# Patient Record
Sex: MALE | Race: WHITE | NOT HISPANIC OR LATINO | Employment: FULL TIME | URBAN - METROPOLITAN AREA
[De-identification: names, ages, dates, MRNs, and addresses within clinical notes are randomized per-mention and may not be internally consistent; named-entity substitution may affect disease eponyms.]

---

## 2017-03-01 DIAGNOSIS — D67 CONGENITAL FACTOR IX DISORDER (H): ICD-10-CM

## 2018-01-23 ENCOUNTER — TELEPHONE (OUTPATIENT)
Dept: HEMATOLOGY | Facility: CLINIC | Age: 38
End: 2018-01-23

## 2018-06-13 ENCOUNTER — TELEPHONE (OUTPATIENT)
Dept: ONCOLOGY | Facility: CLINIC | Age: 38
End: 2018-06-13

## 2018-06-14 ENCOUNTER — OFFICE VISIT (OUTPATIENT)
Dept: HEMATOLOGY | Facility: CLINIC | Age: 38
End: 2018-06-14
Attending: PHYSICIAN ASSISTANT
Payer: COMMERCIAL

## 2018-06-14 ENCOUNTER — TELEPHONE (OUTPATIENT)
Dept: HEMATOLOGY | Facility: CLINIC | Age: 38
End: 2018-06-14

## 2018-06-14 VITALS
RESPIRATION RATE: 12 BRPM | WEIGHT: 183.6 LBS | OXYGEN SATURATION: 99 % | BODY MASS INDEX: 24.33 KG/M2 | TEMPERATURE: 97.5 F | DIASTOLIC BLOOD PRESSURE: 74 MMHG | SYSTOLIC BLOOD PRESSURE: 118 MMHG | HEART RATE: 67 BPM | HEIGHT: 73 IN

## 2018-06-14 DIAGNOSIS — D67 MILD HEMOPHILIA B (H): Primary | ICD-10-CM

## 2018-06-14 DIAGNOSIS — M62.89 HEMORRHAGE OF MUSCLE: ICD-10-CM

## 2018-06-14 PROCEDURE — 25000555 ZZHC RX FACTOR IP 250 OP 636

## 2018-06-14 PROCEDURE — 99213 OFFICE O/P EST LOW 20 MIN: CPT | Performed by: PHYSICIAN ASSISTANT

## 2018-06-14 PROCEDURE — G0463 HOSPITAL OUTPT CLINIC VISIT: HCPCS

## 2018-06-14 RX ORDER — COAGULATION FACTOR IX (RECOMBINANT) 3000 UNIT
3000 KIT INTRAVENOUS ONCE
Qty: 3000 UNITS | Refills: 0 | Status: SHIPPED | OUTPATIENT
Start: 2018-06-14 | End: 2018-06-14

## 2018-06-14 RX ORDER — COAGULATION FACTOR IX (RECOMBINANT) 3000 UNIT
4100 KIT INTRAVENOUS ONCE
Status: CANCELLED
Start: 2018-06-14 | End: 2018-06-14

## 2018-06-14 RX ORDER — COAGULATION FACTOR IX (RECOMBINANT) 3000 UNIT
4020 KIT INTRAVENOUS ONCE
Status: COMPLETED | OUTPATIENT
Start: 2018-06-14 | End: 2018-06-14

## 2018-06-14 RX ADMIN — COAGULATION FACTOR IX (RECOMBINANT) 4020 UNITS: KIT at 15:55

## 2018-06-14 ASSESSMENT — PAIN SCALES - GENERAL: PAINLEVEL: MILD PAIN (2)

## 2018-06-14 NOTE — PROGRESS NOTES
"    [facility]  [address] [place] [address], [place] [address]  Main: [phone], Fax: [phone]    Patient seen at: [facility] at [address] [place] [address].    Outpatient Visit Note:    Patient: Armand Martin  MRN: [id number]  : 1980  KAVIN: 2018    Reason:  Mild Hemophilia B. Left hamstring pain.     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 Armand was last seen by Yinka Cano CNP back in 2015 for his routine comprehensive clinic visit.     About 3 weeks ago, he was playing softball and during a pitching maneuver, he felt a slight pull of the left hamstring. He took a few weeks resting this area and his symptoms eventually resolved without any medical attention.     Then, Armand apparently was playing softball yesterday when he was splinting to a base and felt a \"pop\" at his left hamstring muscle. He immediately felt pain and he left the game immediately and went home. He reports that once he arrived home, he immediate rest, ice and placed a compression bandage over the left hamstring area. He then called the on-call hematology fellow and the fellow also discussed the case with Dr. Langford, who told the patient to take a dose of  Factor IX product that he has at home. He reports that it was Benefix at around 3000 Units and it was  for about 3 years ago. The hematology fellow also recommended that he should be evaluated at the Emergency department. However, the patient refused to be seen at the Emergency department.    Armand reports that he spent about 12 hours resting, icing, elevating and using compression of his left hamstring. 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 He called our center reporting the incident and I am able to see him in clinic today.    He reports that his left hamstring pain has improved slightly as compared to yesterday. He denies left hip or left knee pain. He does complaint of pain with ambulating / bearing weight. No pain with flexion of the knee.     Medications, Allergies, PmHx:   Reviewed by me in the electronic chart.     Social History and Family History:  Deferred.    Objective:  In no acute distress.  Vitals: /74 (BP Location: Left arm, Patient Position: Sitting, Cuff Size: Adult Regular)  Pulse 67  Temp 97.5  F (36.4  C) (Oral)  Resp 12  Ht 1.847 m (6' 0.7\")  Wt 83.3 kg (183 lb 9.6 oz)  SpO2 99%  BMI 24.42 kg/m2  Exam:  His left hamstring is slightly more larger as compare to his right. There is more pain on palpation just below his gluteal muscle, slight discoloration (ecchymosis) at this area. There is some swelling and mild induration at the distal part of the hamstring. No pain with flexion of the knee. No other ecchymosis noted.   Full active ROM of left hip and left knee. Left knee without any swelling or effusion.    Imaging:  Bedside ultrasound performed showed a disruption of muscle fiber at the bicep femoris at site of where the patient has the greatest tenderness. There is an area of small fluid collection at the medial side of the hamstring just proximal to the left knee.    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 The mechanism of injury, clinical exam and bedside ultrasound are all consistent with likely partial hamstring tear. There is no definitive large intra-muscular hemorrhage noted, however, he likely has mild soft tissue hemorrhage.     Diagnosis:  1. Mild Hemophilia B.   2. Mild soft tissue hemorrhage of the " left hamstring.   3. Left hamstring mild partial muscle tear.    Plan:  Reassure the patient that we do not find a large intra-muscular hemorrhage. However, given the mechanism of injury, I will give him one dose of Benefix at 4000 Units (50 Units/kg) today here at the clinic.     He is instructed to continue to rest, ice, compress and elevated.     I will plan on seeing him back in 2-3 weeks for a follow up clinic visit.    He will also need to schedule for a return overdue hemophilia comprehensive clinic visit.       Wayne Hayes PA-C, MPAS  Physician Assistant  [facility].

## 2018-06-14 NOTE — TELEPHONE ENCOUNTER
Patient with known hemopholia B, fr IX level of 17 % calling for advise. He pulled his hamstring muscle while playing soft ball today, he felt a pop and developed a pain. He has  factor IX at home ( 3 years ago) wondering if he can use it. I advised him to come to the ED for evaluation with CT scan and for fr IX. He is not willing to come to ED at the moment. Discussed It might not be harmful to give himself a dose of factor at home but it might lose some of it activity. He is willing to give himself a dose and then be seen in coag clinic in the morning. He knows to go to the ED if he has worsening symptoms, pain, edema, dizziness.He verbalized understanding. Case discussed with Dr. Langford.

## 2018-06-14 NOTE — TELEPHONE ENCOUNTER
"Armand Martin  MRN: [id number]  Male, 37 year old, 1980  Hemophilia B 17%    Faraz called today reporting that he felt a \"pop\" of his mid left hamstring last night playing softball. He had an  dose (exp 2017) and infused himself with it last night.   He has no additional factor at home. His hamstring feels \"tight\".  His pain level at rest is between 3-4, and slightly more when walking.  He has not looked at his hamstring to see if swollen or bruised (because it has been wrapped). He is not using crutches and does not have them at home.  He rested, iced, and used compression last evening.  He did go to work, but left early about 12:30 to go home & rest/ice more. He did call into on call resident last evening, but did not want to go to the ER or get a CT.    We have encouraged him to come into clinic this afternoon to be assessed with possible ultrasound to see if bleeding.  If so, we can infuse with factor.  He agreed to this plan.  Mila Ellis, RN, MSN -Nurse Clinician, [facility] [phone]   "

## 2018-06-14 NOTE — NURSING NOTE
Armand Martin  MRN: [id number]  Male, 37 year old, 1980    Reviewed allergy & med list & updated as needed.    Saw Faraz with MIGDALIA Whitley and Adriana Benson, PT.  Adriana did US imaging on his left hamstring.  There was a small amount of fluid lower than his site of pain.  It is uncertain if this is blood.  MIGDALIA Whitley decided to treat with Benefix 4000 units x1.  Additional recommendations in regards to rest and initiation of stretching were given by Adriana.      Infused Benefix 4020 units into left antecubital vein without problem (Exp 6/2019 Lot [id number]).      We have asked him to return in 2-3 weeks.  Can be combined with CC or comp clinic to follow later.  Mila Ellis, RN, MSN -Nurse Clinician, [facility] [phone]

## 2018-06-14 NOTE — MR AVS SNAPSHOT
"              After Visit Summary   6/14/2018    Armand Martin    MRN: [id number]           Patient Information     Date Of Birth          1980        Visit Information        Provider Department      6/14/2018 2:15 PM Wayne Hayes PA-C [facility]        Today's Diagnoses     Mild hemophilia B (H)    -  1    Hemorrhage of muscle           Follow-ups after your visit        Follow-up notes from your care team     Return in about 2 weeks (around 6/28/2018).      Your next 10 appointments already scheduled     Jul 09, 2018  1:00 PM CDT   COMPREHENSIVE CLINIC VISIT with Robert Langford MD   [facility] ([facility])    [address] [place] [id number] [phone]              Who to contact     If you have questions or need follow up information about today's clinic visit or your schedule please contact [facility] directly at [phone].  Normal or non-critical lab and imaging results will be communicated to you by [facility]cattershart, letter or phone within 4 business days after the clinic has received the results. If you do not hear from us within 7 days, please contact the clinic through [facility] or phone. If you have a critical or abnormal lab result, we will notify you by phone as soon as possible.  Submit refill requests through [employer]Cell or call your pharmacy and they will forward the refill request to us. Please allow 3 business days for your refill to be completed.          Additional Information About Your Visit        Health Wildcatter[facility]art Information     SynapCell lets you send messages to your doctor, view your test results, renew your prescriptions, schedule appointments and more. To sign up, go to [email or web address] . Click on \"Log in\" on the left side of the screen, which will take you to the Welcome page. Then click on \"Sign up Now\" on the right side of " "the page.     You will be asked to enter the access code listed below, as well as some personal information. Please follow the directions to create your username and password.     Your access code is: [email or web address]  Expires: 2018  4:49 PM     Your access code will  in 90 days. If you need help or a new code, please call your [place] clinic or [phone].        Care EveryWhere ID     This is your Care EveryWhere ID. This could be used by other organizations to access your [place] medical records  [id number]        Your Vitals Were     Pulse Temperature Respirations Height Pulse Oximetry BMI (Body Mass Index)    67 97.5  F (36.4  C) (Oral) 12 1.847 m (6' 0.7\") 99% 24.42 kg/m2       Blood Pressure from Last 3 Encounters:   18 118/74   09/11/15 127/75   12 114/72    Weight from Last 3 Encounters:   18 83.3 kg (183 lb 9.6 oz)   09/11/15 83.4 kg (183 lb 14.4 oz)   12 79.1 kg (174 lb 4.8 oz)              Today, you had the following     No orders found for display         Today's Medication Changes          These changes are accurate as of 18  4:49 PM.  If you have any questions, ask your nurse or doctor.               Start taking these medicines.        Dose/Directions    BENEFIX 3000 units Kit   Used for:  Mild hemophilia B (H), Hemorrhage of muscle   Started by:  Wayne Hayes, APRIL        Dose:  3000 Units   Inject 3,000 Units into the vein once for 1 dose Infuse Benefix at 3000 Units +/- 10% IV as needed for acute bleeding episodes.   Quantity:  3000 Units   Refills:  0            Where to get your medicines      These medications were [facility] to [facility] [address] [place] [address] [address] [place] [address]     Phone:  [phone]     BENEFIX 3000 units Kit                Primary Care Provider Office Phone # Fax #    Yvon HUBERT Ricardo [phone]       [facility] [address]  [facility] [address]        Equal Access to Services     SARIKA GÓMEZ : Hadii aad ku hadleilairis Larkin, waselinaanjali birmingham, tarynhina saullaneyanjali gordon, cassie joseanna marieantoine bah . So [place] -[phone].    ATENCIÓN: Si habla español, tiene a novoa disposición servicios gratuitos de asistencia lingüística. Llame al [phone].    We comply with applicable federal civil rights laws and [place] laws. We do not discriminate on the basis of race, color, national origin, age, disability, sex, sexual orientation, or gender identity.            Thank you!     Thank you for choosing [facility]  for your care. Our goal is always to provide you with excellent care. Hearing back from our patients is one way we can continue to improve our services. Please take a few minutes to complete the written survey that you may receive in the mail after your visit with us. Thank you!             Your Updated Medication List - Protect others around you: Learn how to safely use, store and throw away your medicines at www.disposemymeds.org.          This list is accurate as of 6/14/18  4:49 PM.  Always use your most recent med list.                   Brand Name Dispense Instructions for use Diagnosis    BENEFIX 3000 units Kit     3000 Units    Inject 3,000 Units into the vein once for 1 dose Infuse Benefix at 3000 Units +/- 10% IV as needed for acute bleeding episodes.    Mild hemophilia B (H), Hemorrhage of muscle       probiotic Caps      Take 1 capsule by mouth daily.        vitamin D 2000 units Caps      Take 2,000 Units by mouth daily.

## 2018-07-09 ENCOUNTER — OFFICE VISIT (OUTPATIENT)
Dept: HEMATOLOGY | Facility: CLINIC | Age: 38
End: 2018-07-09

## 2018-07-09 ENCOUNTER — OFFICE VISIT (OUTPATIENT)
Dept: HEMATOLOGY | Facility: CLINIC | Age: 38
End: 2018-07-09
Attending: INTERNAL MEDICINE
Payer: COMMERCIAL

## 2018-07-09 ENCOUNTER — OFFICE VISIT (OUTPATIENT)
Dept: HEMATOLOGY | Facility: CLINIC | Age: 38
End: 2018-07-09
Attending: GENETIC COUNSELOR, MS
Payer: COMMERCIAL

## 2018-07-09 ENCOUNTER — HOSPITAL ENCOUNTER (OUTPATIENT)
Dept: PHYSICAL THERAPY | Facility: CLINIC | Age: 38
Setting detail: THERAPIES SERIES
End: 2018-07-09
Attending: PHYSICAL THERAPIST
Payer: COMMERCIAL

## 2018-07-09 VITALS
DIASTOLIC BLOOD PRESSURE: 73 MMHG | SYSTOLIC BLOOD PRESSURE: 124 MMHG | OXYGEN SATURATION: 99 % | HEIGHT: 73 IN | TEMPERATURE: 97.6 F | HEART RATE: 64 BPM | WEIGHT: 182.4 LBS | BODY MASS INDEX: 24.18 KG/M2 | RESPIRATION RATE: 12 BRPM

## 2018-07-09 DIAGNOSIS — D67 HEMOPHILIA B (H): Primary | ICD-10-CM

## 2018-07-09 DIAGNOSIS — S76.312S HAMSTRING MUSCLE STRAIN, LEFT, SEQUELA: ICD-10-CM

## 2018-07-09 DIAGNOSIS — D67 MILD HEMOPHILIA B (H): Primary | ICD-10-CM

## 2018-07-09 DIAGNOSIS — Z71.9 ENCOUNTER FOR COUNSELING: Primary | ICD-10-CM

## 2018-07-09 PROCEDURE — 97110 THERAPEUTIC EXERCISES: CPT | Mod: GP | Performed by: PHYSICAL THERAPIST

## 2018-07-09 PROCEDURE — 99214 OFFICE O/P EST MOD 30 MIN: CPT | Performed by: INTERNAL MEDICINE

## 2018-07-09 PROCEDURE — 40000176 ZZH STATISTIC PT HEMOPHILIA CLINIC VISIT: Performed by: PHYSICAL THERAPIST

## 2018-07-09 PROCEDURE — 97161 PT EVAL LOW COMPLEX 20 MIN: CPT | Mod: GP | Performed by: PHYSICAL THERAPIST

## 2018-07-09 ASSESSMENT — PAIN SCALES - GENERAL: PAINLEVEL: NO PAIN (0)

## 2018-07-09 NOTE — MR AVS SNAPSHOT
"              After Visit Summary   2018    Armand Martin    MRN: [id number]           Patient Information     Date Of Birth          1980        Visit Information        Provider Department      2018 1:04 PM [facility]        Today's Diagnoses     Encounter for counseling    -  1       Follow-ups after your visit        Who to contact     If you have questions or need follow up information about today's clinic visit or your schedule please contact [facility] directly at [phone].  Normal or non-critical lab and imaging results will be communicated to you by ShareSDKhart, letter or phone within 4 business days after the clinic has received the results. If you do not hear from us within 7 days, please contact the clinic through ShareSDKhart or phone. If you have a critical or abnormal lab result, we will notify you by phone as soon as possible.  Submit refill requests through [employer] CRM or call your pharmacy and they will forward the refill request to us. Please allow 3 business days for your refill to be completed.          Additional Information About Your Visit        MyChart Information     Full [employer] CRM lets you send messages to your doctor, view your test results, renew your prescriptions, schedule appointments and more. To sign up, go to [email or web address] . Click on \"Log in\" on the left side of the screen, which will take you to the Welcome page. Then click on \"Sign up Now\" on the right side of the page.     You will be asked to enter the access code listed below, as well as some personal information. Please follow the directions to create your username and password.     Your access code is: [email or web address]  Expires: 2018  4:49 PM     Your access code will  in 90 days. If you need help or a new code, please call your [place] clinic or [phone].        Care EveryWhere ID     This is your Care EveryWhere ID. " This could be used by other organizations to access your [place] medical records  [id number]         Blood Pressure from Last 3 Encounters:   07/09/18 124/73   06/14/18 118/74   09/11/15 127/75    Weight from Last 3 Encounters:   07/09/18 82.7 kg (182 lb 6.4 oz)   06/14/18 83.3 kg (183 lb 9.6 oz)   09/11/15 83.4 kg (183 lb 14.4 oz)              Today, you had the following     No orders found for display       Primary Care Provider Office Phone # Fax #    Yvon Ricardo [phone]       [facility] [address]  [facility] [address]        Equal Access to Services     SARIKA GÓMEZ : Hadii walker irelando Soomaali, waaxda luqadaha, qaybta kaalmada adeegyada, cassie bah . So [facility] [phone].    ATENCIÓN: Si habla español, tiene a novoa disposición servicios gratuitos de asistencia lingüística. [facility] [phone].    We comply with applicable federal civil rights laws and [place] laws. We do not discriminate on the basis of race, color, national origin, age, disability, sex, sexual orientation, or gender identity.            Thank you!     Thank you for choosing [facility]  for your care. Our goal is always to provide you with excellent care. Hearing back from our patients is one way we can continue to improve our services. Please take a few minutes to complete the written survey that you may receive in the mail after your visit with us. Thank you!             Your Updated Medication List - Protect others around you: Learn how to safely use, store and throw away your medicines at www.disposemymeds.org.          This list is accurate as of 7/9/18 11:59 PM.  Always use your most recent med list.                   Brand Name Dispense Instructions for use Diagnosis    GLUCOSAMINE SULFATE PO      Take 1,000 mg by mouth daily Takes occassionally        probiotic Caps      Take 1 capsule by mouth daily.         vitamin D 2000 units Caps      Take 2,000 Units by mouth daily.

## 2018-07-09 NOTE — MR AVS SNAPSHOT
"                  MRN:[id number]                      After Visit Summary   7/9/2018    Armand Martin    MRN: [id number]           Visit Information        Provider Department      7/9/2018 1:00 PM Robert Langford MD [facility]        Care Instructions    If on demand dosing, please call with any bleeding.    For emergency head trauma, please treat with at least 80 units/kg to boost your factor level to 100%.  Please seek emergency care for head CT scan.    Carry factor concentrate with you at all times. Call the center if you will be traveling out of the area. We can create a travel letter prior to your departure . For treatment centers around the world go to [email or web address], click on top right link \"RESOURCES\" and then scroll down to  \"Find a Treatment [place]\". Enter country, then scroll down to city closest to destination.    Wear medic alert on your person for highest level of safety www.Class[employer].org    See dentist every 6 months. Call the center for recommendations for dental visits that involve more than an examination and cleaning. If you have had a joint replacement or port for infusions, please call for antibiotic recommendations for dental cleaning.    See primary care provider for general health maintenance.    For any questions, please contact your [facility] nurse, Mila Ellis RN at [phone] or the main phone number [phone].    If you have emergency needs in the evening or weekend, please call [phone] and ask for the hematologist on call or Dr. Robert Langford.    Please return for comprehensive clinic in 1 year.           Further instructions from your care team       PHYSICAL THERAPY INSTRUCTIONS:    Initiate exercises as issued today, completing every other day using the day in between to assess response.  Increase or decrease reps and resistance based on response.     Continue to monitor left hamstring for full return of strength and function.  Contact Adriana" with any ongoing concerns.  Treatment of New Joint and/or Muscle Bleeds:     Treat with factor per doctor s orders.    Apply RICE treatment as needed for pain relief and to allow rest and healing   o R=Rest, Limit movement and protect your joint with splints, braces and assistive devices as directed by your treatment center.  Use crutches and do not put any weight on a new lower extremity bleed (ex: hip, knee, ankle).   Only move in pain-free range.  o I=Ice, Apply ice to surround the affected area for 15-20 minutes every 2 hours.  Use ice cubes in bag, frozen vegetables, gel ice packs, or commercial products (Cryocuff ).  o C=Compression, Helps to control swelling and can decrease pain. Can use elastic wraps or compression sleeve such as tubigrip.  Avoid tourniquet effect by using proper technique. Remove if pain increases or with any numbness or tingling.   o E=Elevate, Can help decrease swelling and encourages rest.  Most effective with elevation above level of the heart.  Joint Health: Bleed prevention and regular activity are key elements to maintaining health joints. Limit activities that cause joint pain and spread out activities/exercises throughout the day and the week, alternate exercises each day.    Footwear: Always wear supportive footwear-shoes with laces and good ankle support are best.  If you experience frequent foot/ankle pain, try wearing shoes indoors as well as outdoors.    Cardiovascular activity recommendations (18 years and over): For health benefits, adults need at least 150 minutes (about 30 minutes, 5 days a week) of moderate intensity aerobic activity each week AND 2 or more days of muscle strengthening to include all major muscle groups.  Also include a general stretching program into your daily routine.  Try breaking up your aerobic activity into small chunks of time during the day,  10 minutes at a time.      Apoorva Benson [facility]  Physical Therapist  [facility]orders  [phone]      MyChart Information     Triumfant lets you send messages to your doctor, view your test results, renew your prescriptions, schedule appointments and more. To sign up, go to [email or web address] . Click on \"Log in\" on the left side of the screen, which will take you to the Welcome page. Then click on \"Sign up Now\" on the right side of the page.     You will be asked to enter the access code listed below, as well as some personal information. Please follow the directions to create your username and password.     Your access code is: [email or web address]  Expires: 2018  4:49 PM     Your access code will  in 90 days. If you need help or a new code, please call your [place] clinic or [phone].        Care EveryWhere ID     This is your Care EveryWhere ID. This could be used by other organizations to access your [place] medical records  [id number]        Equal Access to Services     SARIKA GÓMEZ : Hadii walker romero hadasho Sotahiraali, waaxda luqadaha, qaybta kaalmada adeegyaanjali, cassie bah . So [facility] [phone].    ATENCIÓN: Si habla español, tiene a novoa disposición servicios gratuitos de asistencia lingüística. Llame al [phone].    We comply with applicable federal civil rights laws and [place] laws. We do not discriminate on the basis of race, color, national origin, age, disability, sex, sexual orientation, or gender identity.            "

## 2018-07-09 NOTE — DISCHARGE INSTRUCTIONS
PHYSICAL THERAPY INSTRUCTIONS:    Initiate exercises as issued today, completing every other day using the day in between to assess response.  Increase or decrease reps and resistance based on response.     Continue to monitor left hamstring for full return of strength and function.  Contact Adriana with any ongoing concerns.  Treatment of New Joint and/or Muscle Bleeds:     Treat with factor per doctor s orders.    Apply RICE treatment as needed for pain relief and to allow rest and healing   o R=Rest, Limit movement and protect your joint with splints, braces and assistive devices as directed by your treatment center.  Use crutches and do not put any weight on a new lower extremity bleed (ex: hip, knee, ankle).   Only move in pain-free range.  o I=Ice, Apply ice to surround the affected area for 15-20 minutes every 2 hours.  Use ice cubes in bag, frozen vegetables, gel ice packs, or commercial products (Cryocuff ).  o C=Compression, Helps to control swelling and can decrease pain. Can use elastic wraps or compression sleeve such as tubigrip.  Avoid tourniquet effect by using proper technique. Remove if pain increases or with any numbness or tingling.   o E=Elevate, Can help decrease swelling and encourages rest.  Most effective with elevation above level of the heart.  Joint Health: Bleed prevention and regular activity are key elements to maintaining health joints. Limit activities that cause joint pain and spread out activities/exercises throughout the day and the week, alternate exercises each day.    Footwear: Always wear supportive footwear-shoes with laces and good ankle support are best.  If you experience frequent foot/ankle pain, try wearing shoes indoors as well as outdoors.    Cardiovascular activity recommendations (18 years and over): For health benefits, adults need at least 150 minutes (about 30 minutes, 5 days a week) of moderate intensity aerobic activity each week AND 2 or more days of muscle  strengthening to include all major muscle groups.  Also include a general stretching program into your daily routine.  Try breaking up your aerobic activity into small chunks of time during the day,  10 minutes at a time.      Apoorva Benson [facility]  Physical Therapist  [facility]  [phone]

## 2018-07-09 NOTE — MR AVS SNAPSHOT
"              After Visit Summary   2018    Armand Martin    MRN: [id number]           Patient Information     Date Of Birth          1980        Visit Information        Provider Department      2018 1:00 PM [facility]        Today's Diagnoses     Hemophilia B (H)    -  1       Follow-ups after your visit        Who to contact     If you have questions or need follow up information about today's clinic visit or your schedule please contact [facility] directly at [phone].  Normal or non-critical lab and imaging results will be communicated to you by Svbtlehart, letter or phone within 4 business days after the clinic has received the results. If you do not hear from us within 7 days, please contact the clinic through [employer] or phone. If you have a critical or abnormal lab result, we will notify you by phone as soon as possible.  Submit refill requests through [employer] or call your pharmacy and they will forward the refill request to us. Please allow 3 business days for your refill to be completed.          Additional Information About Your Visit        MyChart Information     [employer] lets you send messages to your doctor, view your test results, renew your prescriptions, schedule appointments and more. To sign up, go to [email or web address] . Click on \"Log in\" on the left side of the screen, which will take you to the Welcome page. Then click on \"Sign up Now\" on the right side of the page.     You will be asked to enter the access code listed below, as well as some personal information. Please follow the directions to create your username and password.     Your access code is: [email or web address]  Expires: 2018  4:49 PM     Your access code will  in 90 days. If you need help or a new code, please call your [facility] clinic or [phone].        Care EveryWhere ID     This is your Care EveryWhere ID. This could " be used by other organizations to access your [facility] medical records  [id number]         Blood Pressure from Last 3 Encounters:   07/09/18 124/73   06/14/18 118/74   09/11/15 127/75    Weight from Last 3 Encounters:   07/09/18 82.7 kg (182 lb 6.4 oz)   06/14/18 83.3 kg (183 lb 9.6 oz)   09/11/15 83.4 kg (183 lb 14.4 oz)              Today, you had the following     No orders found for display       Primary Care Provider Office Phone # Fax #    Yvon Ricardo [phone]       [facility] [address]  [facility] [address]        Equal Access to Services     SARIKA GÓMEZ : Hadii aad ku hadasho Soomaali, waaxda luqadaha, qaybta kaalmada adeegyada, waxmontana bah . So [facility] [phone].    ATENCIÓN: Si habla español, tiene a novoa disposición servicios gratuitos de asistencia lingüística. [facility] [phone].    We comply with applicable federal civil rights laws and [place] laws. We do not discriminate on the basis of race, color, national origin, age, disability, sex, sexual orientation, or gender identity.            Thank you!     Thank you for choosing [facility]  for your care. Our goal is always to provide you with excellent care. Hearing back from our patients is one way we can continue to improve our services. Please take a few minutes to complete the written survey that you may receive in the mail after your visit with us. Thank you!             Your Updated Medication List - Protect others around you: Learn how to safely use, store and throw away your medicines at www.disposemymeds.org.          This list is accurate as of 7/9/18  2:00 PM.  Always use your most recent med list.                   Brand Name Dispense Instructions for use Diagnosis    GLUCOSAMINE SULFATE PO      Take 1,000 mg by mouth daily Takes occassionally        probiotic Caps      Take 1 capsule by mouth daily.        vitamin D 2000  units Caps      Take 2,000 Units by mouth daily.

## 2018-07-09 NOTE — PROGRESS NOTES
7/9/2018    Presenting Information: Armand Martin was seen for a comprehensive clinic visit at [facility]. I met with him to obtain his family history and to review the inheritance and genetics of hemophilia B.     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 Please see Alfredito Hayes PA-C s note for details regarding his medical history.     Family History: A three generation pedigree, specific to bleeding was obtained today and scanned into the EMR. The following information is significant:     Two daughters, ages 5 and 4, are obligate carriers. His two sons (ages 7 and 2) are unaffected.    Five maternal uncles have hemophilia B. He believes at least one uncle has had genetic testing. Faraz believes that there at least 8 female cousins (daughters of these uncles) that are obligate carriers.     Maternal aunt has four daughters. None have had carrier testing but are aware of the family history.     The family history is otherwise negative for bleeding or known diagnoses of hemophilia B.    Maternal ancestry is [place].  Paternal ancestry is [place].  There is no reported consanguinity.    Discussion:   Faraz stated familiarity with the inheritance of hemophilia B. He is aware that his daughters are both obligate carriers. He knows that he cannot pass on hemophilia B to his sons.   We discussed the possibility of genetic testing. Genetic testing is the best way to identify carriers in the family, as factor IX levels may be normal in carriers. We also discussed that some mutations make it more likely for individuals to develop inhibitors. We did discuss that inhibitor development in men with hemophilia B is less common.   Faraz thinks he may have had genetic testing in childhood, but does not recall for certain. I will look at his old records to determine if this was done in the past, before the electronic medical record system. He states he has always  had his medical care here at the [facility]. Faraz also believes that his uncle has had genetic testing. We discussed that if this was the case, we expect that him and his other affected relatives all have the same genetic mutation. Carrier testing would then be available to his maternal relatives for this mutation. We discussed that the daughters of his maternal aunt would particularly benefit from this information.    We also discussed that some female carriers can have bleeding issues, though most do not. We discussed that his daughters, and other obligate carriers in the family, could have their Factor IX levels checked to determine bleeding risk.     Plan:   1. A three generation pedigree was obtained, and scanned into the EMR.  2. I will check to see if genetic testing was ever completed for Faraz when he was younger.   3. Contact information was provided today. Additional questions or concerns were denied.    Approximate Time Spent in Consultation: 12 minutes.     Francesca Nuno MS, formerly [facility]  Licensed Genetic Counselor  P. [phone]  F. [phone]    CC: No Letter

## 2018-07-09 NOTE — PROGRESS NOTES
HEMOPHILIA EMERGENCY TREATMENT RECOMMENDATIONS    Please CALL the [facility] [phone] if seen in the ER and for additional recommendations and follow-up treatment.    AFTER HOURS: Page  [phone] & ask for hematologist. [place] -[phone].     RECOMMENDATIONS :     Diagnosis: Hemophilia B Factor IX level: 17% Factor IX antibody (inhibitor): No history of inhibitor  Weight: 82 kg  Drug Allergies:  NKDA  Medications: Vitamin D, Probiotics, Benefix Factor IX 3000 units as needed for bleeding  (Avoid aspirin, all other non-steroidal anti-inflammatory drugs, and other drugs known to inhibit platelet function.)    TREATMENT PLAN - Factor MUST be infused PRIOR to IMAGING or SURGICAL procedures. Infuse bolus factor at 10ml/minute.    1. SUSPECTED INTRACRANIAL HEMORRHAGE OR LIFE THREATENING BLEEDS (Hemorrhages or Injuries to Neck, Thorax, or Gastrointestinal Bleeding)  A. Initial Dose: Give 80 units/kg recombinant factor IX (approx.7000 units). 10 minutes after bolus dose draw factor IX level.  B. Begin Factor IX continuous infusion IMMEDIATELY after drawing Factor IX level 4-6 units/kg/hour (approx. 400 units).    2. SERIOUS BLEEDS INTO JOINTS, MUSCLES, SOFT TISSUE, OR GROSS HEMATURIA OR MUCOUS MEMBRANE BLEEDING    A. Initial Dose: 40 units/kg recombinant Factor IX (approx.3500 units)    B. Comment: Amicar may be used for mucous membrane bleeding. The dose is   3 gms TID x 5-10 days. Do not use Amicar if there is hematuria. Splinting and  Immobilization is often used for injured joints/muscles.    C. Follow-up dosing, please contact [facility].  Robert Langford MD,  Director, [facility]     Mila Ellis, RN, MSN, Nurse Clinician, [facility]

## 2018-07-09 NOTE — PROGRESS NOTES
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  Today he reports that his left hamstring has no pain or swelling.    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  He had some difficulty with sitting on saddle style seat while recovering from his left hamstring injury but reports this has returned to normal and he is currently able to complete all tasks necessary for his job.    Exercise/physical activity currently includes hiking and running around with his kids.  Prior to his hamstring injury he was playing basketball and softball 1x/week each.  He also enjoys waterskiing.       Joint issues: Right knee meniscal tear diagnosed in 3/2018, however thought to be old tear at that time.  Non-surgical approach with PT treatment resolved all symptoms.    Orthopedic past medical history:   Right elbow arthroscopic surgery 1997 secondary to baseball injury, records not available  Patient/family rehab goal: Return to full activity as appropriate with left hamstring    Pain:  Chief complaint: Denied today but he does report intermittent very mild pain in his knees R>L that he attributes to normal morning aches and pains.      Fall Risk Screen:  Has the patient fallen 2 or more times in the last year? No  Has the patient fallen and had an injury in the past year? No  Timed Up and Go Score: NA  Is the patient a fall risk? No    Physical Exam:   ROM: Functional in all joints  Atrophy: None, good muscle tone  Crepitus: None  Swelling:  None  Strength: Full strength throughout except left hamstring 4/5 (right 5/5).   Muscle length: Slightly decreased in left hamstring as compared to right  Gait: Independent, all gait components present.     Assessment: Faraz is found to have good joint health and function today without any work or ADL restrictions related to joint disease.  He is currently managing a right knee meniscal tear conservatively without limitations.  He has not returned to full recreational activity since a left hamstring tear leading to muscle bleed in June 2018.  He is found to have full ROM at left hip and knee, decreased length in hamstring end range motion and decreased strength in left hamstring.      Treatment diagnosis:Muscle flexibility limitations, Muscle strength and endurance limitations, Pain and ROM limitations, gait impairment    Clinical Presentation: Stable/Uncomplicated  Clinical Presentation Rationale: Normal progression of hamstring tear recovery  Clinical Decision Making (Complexity): Low complexity  Treatment: Armand is appropriate for hemophilia specific skilled PT services today to address his joint hemarthropathy and home management of his bleeding disorder.  Treatment focused on symptom management and joint protection today and included:     Completed the following exercises in clinic and issued written instructions for HEP to be completed every other day: hamstring stretch-stand, prone hamstring catch, supine hamstring curls with theraband, bridging, bridging with feet on therapy ball, single leg  ball.      Issued red, green and blue theraband for HEP progression.      Instructed in exercise progressions and to continue to monitor left hamstring for full return of strength and function.    Plan of Care:   1. Initiate HEP per above.  2. Contact our clinic with ongoing concerns or if left hamstring does not return to full strength and function.  3. Will plan to see at next clinic visit.    Therapy Frequency  and Predicted Duration of Therapy Intervention: One session evaluation and treatment  Goals: Patient verbalizes understanding of evaluation results, home management and home exercise recommendations provided today to maximize functional mobility and allow for continued safe participation in current home and work activities. -Goal Met    Recommendations: Follow up at next scheduled [facility] clinic visit  Educational assessment/Barriers to learning: No barriers noted  Treatment provided this date (including minutes): Therapeutic Procedure: 15   Patient/Family Education:Signs and symptoms of a bleed, Adjunctive treatment/RICE and Home exercise program: Written and verbal instruction in the following: see above   Risks and benefits of evaluation/treatment have been explained.  Patient, family and/or caregiver are in agreement with Plan of Care.     Timed Code Treatment Minutes: 15   Total Treatment Time (sum of timed and untimed services): 40    Signature: Apoorva Benson [facility]  Physical Therapist  [facility]  [phone]  Date: 7/9/2018

## 2018-07-09 NOTE — PATIENT INSTRUCTIONS
"If on demand dosing, please call with any bleeding.    For emergency head trauma, please treat with at least 80 units/kg to boost your factor level to 100%.  Please seek emergency care for head CT scan.    Carry factor concentrate with you at all times. Call the center if you will be traveling out of the area. We can create a travel letter prior to your departure . For treatment centers around the world go to [email or web address], click on top right link \"RESOURCES\" and then scroll down to  \"Find a Treatment [place]\". Enter country, then scroll down to city closest to destination.    Wear medic alert on your person for highest level of safety [email or web address]    See dentist every 6 months. Call the center for recommendations for dental visits that involve more than an examination and cleaning. If you have had a joint replacement or port for infusions, please call for antibiotic recommendations for dental cleaning.    See primary care provider for general health maintenance.    For any questions, please contact your [facility] nurse, Mila Ellis RN at [phone] or the main phone number [phone].    If you have emergency needs in the evening or weekend, please call [phone] and ask for the hematologist on call or Dr. Robert Langford.    Please return for comprehensive clinic in 1 year.  "

## 2018-07-10 NOTE — PROGRESS NOTES
Comprehensive Hemophilia Clinic Visit   [facility]  [address] [place] [id number]  Phone: [phone], Fax: [phone]    Patient: Armand Martin  MRN: [id number]  : 1980  KAVIN: 2018    Last Comprehensive Hemophilia Clinic Date:   2015  Last Clinic Visit Date:  2018    Hemophilia History:  Mr. Martin is a 37 year old male who has mild hemophilia B with a baseline Factor IX level of around 11-17%, who presents to clinic today for routine comprehensive hemophilia clinic visit.  Armand is part of a large family with multiple members with mild hemophilia B. He has six maternal uncles with mild hemophilia B who are all known to our center.     Historically Armand does not have any significant issues with bleeding despite an active lifestyle including playing touch football, frequent basketball and softball. But with injury or trauma, he has experienced some soft tissue hemorrhages. He reports that he has had no issues with hemarthrosis. He has no history of hemophilic arthropathy.     Armand does have a history of Crohn's Disease affecting the large intestine and underwent a partial colectomy in [place] in , for which he was treated with recombinant factor IX (Benefix).  He does routinely have colonoscopy every few years. Last colonoscopy was done in  and did receive a dose of Benefix prior to the procedure.      In May 2012, he underwent a melanoma removal surgery. This excision was quite extensive and he developed a hematoma under the incision and eventually had wound dehiscence. At the time, he was again treated with Benefix.     Throughout the years, he has had over 35 nevi removed. He does not usually get Factor IX replacement prior to most of these procedures.     Most recently he was seen at clinic for left hamstring pain after he felt a pull of the left hamstring during a pitching maneuver at a softball game. He treated with a dose at  home of  (by 8 months) Benefix and rested, along with icing.  We performed a bedside ultrasound evaluation of his hamstring, which showed evidence of disruption of muscle fiber at the bicep femoris and an area of small fluid collection at the medial side of the hamstring just proximal to the left knee. With these findings, we elected to give him another dose of Benefix while he was in clinic. Today, he reports that his left hamstring is almost back to normal without any further pain. At the time of his 2018 visit, we also provided him with a dose of extra Benefix to take home for emergency use.     Current Factor Treatment Regimen:  He is on on-demand treatment for acute bleeding episodes. 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    Bleeding episodes in the past year or since last comprehensive clinic visit:  Since his last hemophilia comprehensive clinic visit in , he has one likely left thigh (hamstring) soft tissue hemorrhage as described above (2018). No report of hemarthrosis.     Emergency Department or Hospitalization in the past year:  He has had no Emergency Department visits or any hospitalizations in the past year or since last hemophilia comprehensive clinic visit in .     Joint Health:  Historically, he does have long standing issues with his right knee for which he occasionally has some right knee pain. This is usually controlled with just Tylenol. He is not currently on any opiates. He does not report any stiffness of any joints. Armand has no limitations/restrictions on school/work or recreational activities.     Infection disease status:  As of , he was negative for HIV and Hepatitis C. Serology for Hepatitis A and Hepatitis B showed immunization status back in .     Routine Health Maintenance:  He does have a primary care physician, Dr. Yvon Ricardo. His last visit with him was back about 4 months ago. His last dental visit was  "about 4 months ago.       Medications:  Current Outpatient Prescriptions   Medication     Cholecalciferol (VITAMIN D) 2000 UNIT CAPS     GLUCOSAMINE SULFATE PO     probiotic CAPS     No current facility-administered medications for this visit.        Allergies:  Allergies   Allergen Reactions     Aspirin      This drug inhibits platelets and is contraindicated due to hemophilia diagnosis.        Nsaids      This drug inhibits platelets and is contraindicated due to hemophilia diagnosis.          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      Social History:  Please see Carola ELENA's note for her evaluation and assessment.   Denies any tobacco use.  No significant alcohol use.  Denies any illicit drug use.  No marijuana use.   He works as a dentist. He has had no missed days from work that was related to his hemophilia in the past year.     ROS:  Complete ROS is negative, except as noted above.     Objective:  Vitals: /73 (BP Location: Right arm, Patient Position: Sitting, Cuff Size: Adult Regular)  Pulse 64  Temp 97.6  F (36.4  C) (Oral)  Resp 12  Ht 1.842 m (6' 0.5\")  Wt 82.7 kg (182 lb 6.4 oz)  SpO2 99%  BMI 24.4 kg/m2  Exam:  General: No acute distress  Lungs: Clear to auscultation bilaterally.   Card: S1 S2, No murmur rubs or gallops. Regular rate and rhythm.  Abd: Non tender, Non distended, Soft. Positive bowel sounds throughout.   Ext: Please see Adriana RIOS's note for our combined joint evaluation.       Assessment:  In summary, Armand Martin is a 37 year old year old man with mild hemophilia B with a baseline factor IX level of 11-17%, a history of melanoma and Crohn's Disease, who presents for routine comprehensive hemophilia clinic visit. Armand has done well from a hemophilia standpoint. He has had only one minor " soft tissue hemorrhage since his last comprehensive clinic visit with us back in 2015.  His joints are well preserved with no obvious evidence of any hemophilic arthropathy.    Plan:  No change in regard to management of his mild hemophilia B. He will continue with on-demand factor IX replacement. He has a dose of Benefix at home for emergency use.     We provided him with a refresher and education in regard to management of Hemophilia. Reminded him on recognition of an acute bleeding events and the importance of calling our center if he should need any invasive procedures or surgeries.     He is planning to have another colonoscopy at the end of this year.  We discussed that potentially he can have the procedure done without Factor IX replacement and only provide Factor IX replacement if he should have any biopsy or polypectomy done. We will also consider the use of Amicar or Lysteda if he should have any biopsy or polypectomy done. He is instructed to call our center to get a final recommendation once he has a date scheduled for the procedure.    At this time, we will plan on seeing him back on an annual basis for his routine hemophilia comprehensive clinic visit. Sooner if he should have any unusual bleeding issues.     Adriana Benson [facility]; Carola Fuentes [facility]; Francesca Nuno [facility]; Carola Stallings, hemophilia pharmacy supervisor; and Jodee Ellis, nurse clinician have all seen the patient independently, please refer to their notes for their evaluation and assessment.     Dr. Robert Langford, staff hematologist has also seen the patient today.       Wayne Hayes PA-C, MPAS  Physician Assistant  [facility]      HEMATOLOGY STAFF    Patient seen and evaluated as part of a shared visit.  I have reviewed the clinical history, physical exam, relevant labs, and treatment plan with the KANDY, as well as other members of the comprehensive hemophilia care team.      Key findings:   38 yo male with mild hemophilia B (baseline factor IX level 11-17%).  As expected, he has rare bleeding episodes.  Currently no obvious hemophilic arthropathy.    Key management decisions:  Doing well from hemophilia perspective.  Able to do self-infusions, and since he has a very active lifestyle, it is reasonable for him to have a single dose of factor at home.  Stressed the importance of contacting us if he feels the need to treat with factor, and also prior to invasive procedures.  We should see him back annually.        Robert Langford MD  Associate Professor of Medicine  Division of Hematology, Oncology, and Transplantation  Director, [facility]

## 2018-07-11 NOTE — PROGRESS NOTES
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   Met with the patient 1:1 to complete an updated psychosocial evaluation.    Living Situation:   Faraz and his wife, Jess, live in [place] [place] [facility] along with their 4 young children (ages 7, 5, 4 and 21 months). Housing is described as stable.    Family/Social Support:  Faraz grew up in [place].  Family includes his mother, step-parent, and father (still residing near [place]), and two younger brothers ([facility] and out of state).  He described a strong network of friends and colleagues as well.    Functional Status: Independent with ADLs and IADLs.    Current Community Services:  Copay assistance for factor medication.    Chemical Dependency:    Faraz stated he will have 1-2 drinks every few weeks.  He denied psychosocial impact related to use. He denied use of tobacco or illicit substances.    Mental/Emotional Health:   The patient reported a history of the following mental health concerns and/or diagnoses: \"health anxiety.\"   He stated there have been times he has perseverated around medical events, particularly bleeds, but \"my wife calls me on it and I have seen a therapist.\"  He stated this occurred 4-5 years ago and has not seen a counselor since. He is able to identify coping strategies that help him if he is feeling anxious.  PHQ-2: 0.      Abuse Concerns:  No concerns indicated.    Education: Highest level of education - DDS from [employer] in [place]. He also received his undergraduate degree.  He moved back to [place] in .    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    The patient has missed zero days of work in the past year due to hemophiila " "related concerns.    Financial/Income:  Payroll.   In addition to his income, his wife works part-time as a \"business \" where she works in-house an investment firm for 10 hours a week. No financial concerns were identified.    Health Insurance:  BCBS.  He reported no concerns related to out of pocket costs.  He does have a high deductible ($5000) but that they have an HSA to help off-set costs.  His wife has discussed copay assistance with Carola Stallings, pharmacy supervisor.    Health Literacy/Adjustment to Illness:  Faraz seems to have a good understanding of his health care need sin general.  He has had minimal issues related to hemophilia.  Faraz is able to articulate how anxiety at times has manifested related to health care, but he has successfully gained coping skills to manage this.    Advanced Care Planning:  Faraz and family has worked with an  on living will information and will be updating it soon.  Discussed the importance and benefit of ACP and encouraged him to provide [place]/P with a copy for his record if it pertains to health care.    Authorization to discuss PHI is on file and up to date listing his wife, and electronic consent was completed today.    Interventions Utilized:   Assessment of patient's strengths and needs  Assessment of impact of hemophilia, overall adjustment and current coping skills  Explored aspects of family history and dynamics   Explored and processed emotional/social responses to hemophilia and treatment  Normalized and validated feelings and experiences  Provided positive feedback and encouragement  Discussed advanced care planning  Case Coordination    Impressions/Recommendations:    The patient presented as alert, oriented and engaged. He was receptive to SW visit.  Faraz seems to have a good understanding of his medical needs. He has a strong support system and has developed good coping skills around health anxiety,which he experienced several years ago.  " Encouraged him to consider ACP and to contact writer for more information as needed.     Plan:   There were no further SW questions at the conclusion of today's visit.  Writer will remain available to assist with psychosocial needs or concerns as they arise.  Contact information was provided and the patient was encouraged to call as needed.    SUSI Correa, LICSW, ACM  Clinical   [facility]  Advanced Therapies Program and Clinical Trials Services  Phone: [phone]

## 2018-07-12 NOTE — NURSING NOTE
Reason for Visit:  Hemophilia Comprehensive Evaluation  Face To Face Time for Education (After provider visit): 10 minutes  Reviewed & updated allergy & med list.     Teaching Flowsheet   Armand Martin  has a diagnosis of HEMOPHILIA B  with a baseline factor  FACTOR 9 of 11-17%% . He  presents today for his  comprehensive Hemophilia clinic evaluation.  Teaching Topic: Review treatment plan & modify prn     Person(s) involved in teaching:   Patient     Motivation Level:good  Asks Questions: Yes  Eager to Learn: Yes  Cooperative: Yes  Receptive (willing/able to accept information): Yes     Patient demonstrates understanding of the following:  Reason for the appointment, diagnosis and treatment plan: Yes  Knowledge of proper use of medications and conditions for which they are ordered (with special attention to potential side effects or drug interactions): Yes  Which situations necessitate calling provider and whom to contact: Yes      Nutritional needs and diet plan: NA  Pain management techniques: Yes  Wound Care: NA  How and/when to access community resources: Yes      Armand Martin   averages 0-1  Bleeding Episodes per Month . Annual Bleed Rate (ABR)=1.  He uses Benefix brand factor IX concentrate and treats on demand.  He treats himself using peripheral venipuncture rarely and has a dose of 3000 units at home for emergency use.     In the last 12 months he has had the following number of bleeds in each location:  Left hamstring bleed after softball injury     - Reviewed procedure for home infusions of factor concentrates.  See attached treatment recommendations.    - Reviewed severe/life threatening hemorrhage and handout given that recommends appropriate dosing.  If suspect bleeding in head, neck, throat or abdomen, give dose of factor if able, contact the hemophilia center immediately or report to the Emergency Room at [facility] or the nearest emergency room.   - He has some mild  chronic pain both knees R>L.  He does not takes anti-inflammatory, but takes Tylenoxl 1000 mg rarely.  He does not have any joint stiffness.   - Discussed importance of Medic Alert identification.  Medic Alert website given to patient.   - Discussed need for dental check-ups and his last appointment was 4 months ago.  He is a practicing dentist.  -Patient's primary provider is Yvon Ricardo.  Reviewed instructions on AVS with patient.    Discussed and gave copies of the following handouts with patient.  [facility] Contact Card  Mila Ellis, RN, MSN -Nurse Clinician, [facility] [phone]

## 2018-07-19 ENCOUNTER — TELEPHONE (OUTPATIENT)
Dept: HEMATOLOGY | Facility: CLINIC | Age: 38
End: 2018-07-19

## 2018-07-19 NOTE — TELEPHONE ENCOUNTER
7/19/2018    I called Armand today to follow up with him, but was unable to reach him.  I had checked to see if he had genetic testing in childhood. I left a non-detailed voicemail with my name and phone number.    Francesca Nuno MS, [facility]  Licensed Genetic Counselor  P. [phone]  F. [phone]      Addendum 7/19    Faraz called back, and we discussed that he did not have genetic testing in the past. However, he thinks that his uncle may have. This was also noted in his older records. He will check with his uncle to see if this was ever done, as we expect that all affected family members carry the same F9 mutation. He will check with his uncle and report back to me.    Francesca Nuno MS, [facility]  Licensed Genetic Counselor  P. [phone]  F. [phone]

## 2019-01-16 DIAGNOSIS — D67 HEMOPHILIA B (H): Primary | ICD-10-CM

## 2019-01-16 RX ORDER — COAGULATION FACTOR IX (RECOMBINANT) 1000 UNIT
4000 KIT INTRAVENOUS ONCE
Status: CANCELLED | OUTPATIENT
Start: 2019-01-22

## 2019-01-16 RX ORDER — COAGULATION FACTOR IX (RECOMBINANT) 1000 UNIT
3860 KIT INTRAVENOUS ONCE
Status: COMPLETED | OUTPATIENT
Start: 2019-01-22 | End: 2019-01-22

## 2019-01-16 NOTE — PROGRESS NOTES
Armand Martin has mild Hemophilia B with a baseline factor IX of <11-17%. He was last seenby our team on July 9, 2018.    Faraz is calling today to request an infusion of factor IX prior to a colonoscopy on 1/22/19.  This will be done at an outside facility at noon on that day.  He says that past colonoscopies have involved removing polyps,  so expects that will be the case again.  Faraz has done well in the past with Benefix 4000 units IV prior to invasive procedures.     Benefix 4000 units ordered and Faraz is scheduled for an appt for 10:30 on Tuesday 1/22 to be infused.    Fior Rubio, RN - Nurse Clinician - [facility] - [phone]

## 2019-01-22 ENCOUNTER — ALLIED HEALTH/NURSE VISIT (OUTPATIENT)
Dept: HEMATOLOGY | Facility: CLINIC | Age: 39
End: 2019-01-22
Payer: COMMERCIAL

## 2019-01-22 ENCOUNTER — TELEPHONE (OUTPATIENT)
Dept: HEMATOLOGY | Facility: CLINIC | Age: 39
End: 2019-01-22

## 2019-01-22 VITALS
HEIGHT: 73 IN | HEART RATE: 73 BPM | TEMPERATURE: 97.5 F | RESPIRATION RATE: 12 BRPM | DIASTOLIC BLOOD PRESSURE: 83 MMHG | OXYGEN SATURATION: 100 % | WEIGHT: 185.1 LBS | SYSTOLIC BLOOD PRESSURE: 126 MMHG | BODY MASS INDEX: 24.53 KG/M2

## 2019-01-22 DIAGNOSIS — D67 HEMOPHILIA B (H): Primary | ICD-10-CM

## 2019-01-22 DIAGNOSIS — D67 MILD HEMOPHILIA B (H): Primary | ICD-10-CM

## 2019-01-22 PROCEDURE — 40000269 ZZH STATISTIC NO CHARGE FACILITY FEE

## 2019-01-22 PROCEDURE — 25000555 ZZHC RX FACTOR IP 250 OP 636: Performed by: PHYSICIAN ASSISTANT

## 2019-01-22 RX ORDER — AMINOCAPROIC ACID 1000 MG/1
TABLET ORAL
Qty: 90 TABLET | Refills: 0 | Status: SHIPPED | OUTPATIENT
Start: 2019-01-22

## 2019-01-22 RX ADMIN — COAGULATION FACTOR IX (RECOMBINANT) 3860 UNITS: KIT at 11:00

## 2019-01-22 ASSESSMENT — MIFFLIN-ST. JEOR: SCORE: 1818.49

## 2019-01-22 ASSESSMENT — PAIN SCALES - GENERAL: PAINLEVEL: NO PAIN (0)

## 2019-01-22 NOTE — TELEPHONE ENCOUNTER
Armand Martin has mild hemophilia B with a baseline factor IX of 17%.  He was in for a Benefix infusion prior to a colonoscopy today.  He called back to say that they did take acould of biopsies as a precaution.  He had been prescribed Amicar to take in this event, and wanted out team to know that he would be starring this medication.  I did say that the eschars usually fall off at 10-14 days, so he could opt to wait a couple of days to start eh 10 days course.  At the very least, we do not expect him to see any bleeding and he should call if he should have concerns.    Fior Rubio, RN - Nurse Clinician - [facility] - [phone]

## 2020-02-04 ENCOUNTER — OFFICE VISIT (OUTPATIENT)
Dept: UROLOGY | Facility: CLINIC | Age: 40
End: 2020-02-04
Payer: COMMERCIAL

## 2020-02-04 VITALS
SYSTOLIC BLOOD PRESSURE: 108 MMHG | OXYGEN SATURATION: 99 % | DIASTOLIC BLOOD PRESSURE: 78 MMHG | HEIGHT: 73 IN | HEART RATE: 60 BPM | BODY MASS INDEX: 24.52 KG/M2 | WEIGHT: 185 LBS

## 2020-02-04 DIAGNOSIS — Z30.2 ENCOUNTER FOR STERILIZATION: Primary | ICD-10-CM

## 2020-02-04 PROCEDURE — 99203 OFFICE O/P NEW LOW 30 MIN: CPT | Performed by: UROLOGY

## 2020-02-04 ASSESSMENT — MIFFLIN-ST. JEOR: SCORE: 1813.03

## 2020-02-04 ASSESSMENT — PAIN SCALES - GENERAL: PAINLEVEL: NO PAIN (0)

## 2020-02-04 NOTE — PROGRESS NOTES
VASECTOMY CONSULTATION NOTE  [facility]  [phone]  DATE OF VISIT: 2/4/2020    PATIENT NAME: Armand Martin    YOB: 1980      REASON FOR CONSULTATION: Mr. Armand Martin is a 39 year old year old gentleman who came to the urology clinic today requesting a vasectomy. He has 4 children and he wishes to have a vasectomy for birth control.     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  He has had prior surgical procedures.  Previously he has had a factor IX infusion before more major surgical procedures and has not required infusion before minor procedures.  We had he has no prior urologic history and has had no prior surgery on the testicles. He has no symptoms in the testicles.    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 (Patient not taking: Reported on 2/4/2020), Disp: 90 tablet, Rfl: 0     GLUCOSAMINE SULFATE PO, Take 1,000 mg by mouth daily Takes occassionally, Disp: , Rfl:      probiotic CAPS, Take 1 capsule by mouth daily., Disp: , Rfl:     ALLERGIES:   Allergies   Allergen Reactions     Aspirin      This drug inhibits platelets and is contraindicated due to hemophilia diagnosis.        Nsaids      This drug inhibits platelets and is contraindicated due to hemophilia diagnosis.          FAMILY HISTORY:   Family History   Problem Relation Age of Onset     Other Cancer Paternal Grandmother          leukemia     Mental Illness Maternal Grandfather         alzheimers       SOCIAL HISTORY:   Social History     Socioeconomic History     Marital status:      Spouse name: Not on file     Number of children: Not on file     Years of education: Not on file     Highest education level: Not on file   Occupational History     Not on file   Social Needs     Financial resource strain: Not on file     Food insecurity:     Worry: Not on file     Inability: Not on file     Transportation needs:     Medical: Not on file     Non-medical: Not on file   Tobacco Use     Smoking status: Never Smoker     Smokeless tobacco: Never Used   Substance and Sexual Activity     Alcohol use: Yes     Comment: occasional     Drug use: No     Sexual activity: Not on file   Lifestyle     Physical activity:     Days per week: Not on file     Minutes per session: Not on file     Stress: Not on file   Relationships     Social connections:     Talks on phone: Not on file     Gets together: Not on file     Attends Protestant service: Not on file     Active member of club or organization: Not on file     Attends meetings of clubs or organizations: Not on file     Relationship status: Not on file     Intimate partner violence:     Fear of current or ex partner: Not on file     Emotionally abused: Not on file     Physically abused: Not on file     Forced sexual activity: Not on file   Other Topics Concern     Parent/sibling w/ CABG, MI or angioplasty before 65F 55M? Not Asked   Social History Narrative     Not on file       REVIEW OF SYSTEMS:  Skin: No rash, pruritis, or skin pigmentation  Eyes: No changes in vision  Ears/Nose/Throat: No changes in hearing, no nosebleeds  Respiratory: No shortness of breath, dyspnea on exertion, cough, or hemoptysis  Cardiovascular: No chest pain or palpitations  Gastrointestinal: No diarrhea or constipation. No abdominal pain. No hematochezia  Genitourinary: see HPI  Musculoskeletal: No pain or swelling of joints,  "normal range of motion  Neurologic: No weakness or tremors  Psychiatric: No recent changes in memory or mood  Hematologic/Lymphatic/Immunologic: No easy bruising or enlarged lymph nodes  Endocrine: No weight gain or loss      HEIGHT: 6' 1\"     WEIGHT: 185 lbs 0 oz   BP: 108/78    PULSE: 60    EXAM: He is alert and oriented and well-appearing.  Examination of the scrotum reveals normal scrotal skin.  The testicles are normal to palpation bilaterally with no intratesticular lesions.  He has normally palpable vasa bilaterally.    DIAGNOSIS: Request for sterilization    PLAN: The risks of the procedure as well as expectations for recovery and outcomes were splint in detail to him.  He was counseled on the risks for bleeding infection and pain after the procedure.  He was instructed to continue to use contraception until he had proven azoospermia on a semen specimen.  This would normally be collected at least 3 months after the procedure.  He was instructed to hold all anticoagulants medications for one week prior to the procedure.  He was also instructed to shave the scrotum prior to procedure.  It was recommended that he have someone else drive him home after his vasectomy.  In light of these risks and expectations he would like to proceed.  We are scheduling a vasectomy in the office in the near future.    We had a lengthy discussion about his hemophilia B today.  We discussed the real risk of bleeding with vasectomy.  He will discuss with his hematologist possibly receiving a factor IX infusion before his vasectomy and I will defer to his hematologists opinion regarding this.  Regardless, we did discuss the risks of bleeding with vasectomy.    Thiago Santana M.D.    "

## 2020-02-04 NOTE — NURSING NOTE
Chief Complaint   Patient presents with     Sterilization     Pt here for vasectomy consult     Noreen Saucedo CMA

## 2020-02-04 NOTE — LETTER
2/4/2020     RE: Armand Martin  [address] [place] [address]     Dear Colleague,    Thank you for referring your patient, Armand Martin, to the [facility] at [facility]. Please see a copy of my visit note below.    VASECTOMY CONSULTATION NOTE  [facility]  [phone]  DATE OF VISIT: 2/4/2020    PATIENT NAME: Armand Martin    YOB: 1980      REASON FOR CONSULTATION: Mr. Armand Martin is a 39 year old year old gentleman who came to the urology clinic today requesting a vasectomy. He has 4 children and he wishes to have a vasectomy for birth control.     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  He has had prior surgical procedures.  Previously he has had a factor IX infusion before more major surgical procedures and has not required infusion before minor procedures.  We had he has no prior urologic history and has had no prior surgery on the testicles. He has no symptoms in the testicles.    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 (Patient not taking: Reported on 2/4/2020), Disp: 90 tablet, Rfl: 0     GLUCOSAMINE SULFATE PO, Take 1,000 mg by mouth daily Takes occassionally, Disp: , Rfl:      probiotic CAPS, Take 1 capsule by mouth daily., Disp: , Rfl:     ALLERGIES:   Allergies   Allergen Reactions     Aspirin       This drug inhibits platelets and is contraindicated due to hemophilia diagnosis.        Nsaids      This drug inhibits platelets and is contraindicated due to hemophilia diagnosis.          FAMILY HISTORY:   Family History   Problem Relation Age of Onset     Other Cancer Paternal Grandmother         leukemia     Mental Illness Maternal Grandfather         alzheimers       SOCIAL HISTORY:   Social History     Socioeconomic History     Marital status:      Spouse name: Not on file     Number of children: Not on file     Years of education: Not on file     Highest education level: Not on file   Occupational History     Not on file   Social Needs     Financial resource strain: Not on file     Food insecurity:     Worry: Not on file     Inability: Not on file     Transportation needs:     Medical: Not on file     Non-medical: Not on file   Tobacco Use     Smoking status: Never Smoker     Smokeless tobacco: Never Used   Substance and Sexual Activity     Alcohol use: Yes     Comment: occasional     Drug use: No     Sexual activity: Not on file   Lifestyle     Physical activity:     Days per week: Not on file     Minutes per session: Not on file     Stress: Not on file   Relationships     Social connections:     Talks on phone: Not on file     Gets together: Not on file     Attends [employer] service: Not on file     Active member of club or organization: Not on file     Attends meetings of clubs or organizations: Not on file     Relationship status: Not on file     Intimate partner violence:     Fear of current or ex partner: Not on file     Emotionally abused: Not on file     Physically abused: Not on file     Forced sexual activity: Not on file   Other Topics Concern     Parent/sibling w/ CABG, MI or angioplasty before 65F 55M? Not Asked   Social History Narrative     Not on file       REVIEW OF SYSTEMS:  Skin: No rash, pruritis, or skin pigmentation  Eyes: No changes in vision  Ears/Nose/Throat: No changes in  "hearing, no nosebleeds  Respiratory: No shortness of breath, dyspnea on exertion, cough, or hemoptysis  Cardiovascular: No chest pain or palpitations  Gastrointestinal: No diarrhea or constipation. No abdominal pain. No hematochezia  Genitourinary: see HPI  Musculoskeletal: No pain or swelling of joints, normal range of motion  Neurologic: No weakness or tremors  Psychiatric: No recent changes in memory or mood  Hematologic/Lymphatic/Immunologic: No easy bruising or enlarged lymph nodes  Endocrine: No weight gain or loss      HEIGHT: 6' 1\"     WEIGHT: 185 lbs 0 oz   BP: 108/78    PULSE: 60    EXAM: He is alert and oriented and well-appearing.  Examination of the scrotum reveals normal scrotal skin.  The testicles are normal to palpation bilaterally with no intratesticular lesions.  He has normally palpable vasa bilaterally.    DIAGNOSIS: Request for sterilization    PLAN: The risks of the procedure as well as expectations for recovery and outcomes were splint in detail to him.  He was counseled on the risks for bleeding infection and pain after the procedure.  He was instructed to continue to use contraception until he had proven azoospermia on a semen specimen.  This would normally be collected at least 3 months after the procedure.  He was instructed to hold all anticoagulants medications for one week prior to the procedure.  He was also instructed to shave the scrotum prior to procedure.  It was recommended that he have someone else drive him home after his vasectomy.  In light of these risks and expectations he would like to proceed.  We are scheduling a vasectomy in the office in the near future.    We had a lengthy discussion about his hemophilia B today.  We discussed the real risk of bleeding with vasectomy.  He will discuss with his hematologist possibly receiving a factor IX infusion before his vasectomy and I will defer to his hematologists opinion regarding this.  Regardless, we did discuss the risks of " bleeding with vasectomy.    Thiago Santana M.D.

## 2020-02-26 ENCOUNTER — OFFICE VISIT (OUTPATIENT)
Dept: HEMATOLOGY | Facility: CLINIC | Age: 40
End: 2020-02-26
Attending: PHYSICIAN ASSISTANT
Payer: COMMERCIAL

## 2020-02-26 VITALS
HEIGHT: 73 IN | OXYGEN SATURATION: 100 % | HEART RATE: 78 BPM | RESPIRATION RATE: 14 BRPM | SYSTOLIC BLOOD PRESSURE: 123 MMHG | BODY MASS INDEX: 24.81 KG/M2 | DIASTOLIC BLOOD PRESSURE: 80 MMHG | WEIGHT: 187.2 LBS

## 2020-02-26 DIAGNOSIS — S70.11XA CONTUSION OF RIGHT THIGH, INITIAL ENCOUNTER: ICD-10-CM

## 2020-02-26 DIAGNOSIS — D67 MILD HEMOPHILIA B (H): Primary | ICD-10-CM

## 2020-02-26 DIAGNOSIS — M62.89 HEMORRHAGE OF MUSCLE: ICD-10-CM

## 2020-02-26 DIAGNOSIS — M79.651 PAIN OF RIGHT THIGH: ICD-10-CM

## 2020-02-26 PROCEDURE — 99214 OFFICE O/P EST MOD 30 MIN: CPT | Performed by: PHYSICIAN ASSISTANT

## 2020-02-26 PROCEDURE — G0463 HOSPITAL OUTPT CLINIC VISIT: HCPCS | Mod: 25

## 2020-02-26 PROCEDURE — 25000555 ZZHC RX FACTOR IP 250 OP 636: Performed by: PHYSICIAN ASSISTANT

## 2020-02-26 PROCEDURE — 96374 THER/PROPH/DIAG INJ IV PUSH: CPT

## 2020-02-26 RX ORDER — COAGULATION FACTOR IX (RECOMBINANT) 1000 UNIT
3602 KIT INTRAVENOUS ONCE
Status: COMPLETED | OUTPATIENT
Start: 2020-02-26 | End: 2020-02-26

## 2020-02-26 RX ADMIN — COAGULATION FACTOR IX (RECOMBINANT) 3602 UNITS: KIT at 15:21

## 2020-02-26 ASSESSMENT — PAIN SCALES - GENERAL: PAINLEVEL: MODERATE PAIN (5)

## 2020-02-26 ASSESSMENT — MIFFLIN-ST. JEOR: SCORE: 1822.88

## 2020-02-26 NOTE — NURSING NOTE
Armand Martin is here for a hemophilia return visit and is  being seen for his mild hemophilia B and right thigh contusion.      Vital signs were taken and assessed.  Allergies and medications were reviewed and updated by [facility] staff.    I greeted Faraz, who is well known to our center.    Benefix infused without incident.  See MAR. Discussed various factor IX products on the market and their respective half lives. Mentioned possible free trial of another product - will be discussed at a future comp visit.    Patient reminded to make a comp appt - he says Tuesdays work best with his schedule.       I  thanked Faraz for coming and encouraged him to call with any concerns or questions.    Fior Rubio, RN - Nurse Clinician - [facility] - [phone]

## 2020-02-26 NOTE — PROGRESS NOTES
[facility]  [address] [place] [address], [place] [address]  Main: [phone], Fax: [phone]    Patient seen at: [facility] at [address] [place] [address]    Outpatient Visit Note:    Patient: Armand Martin  MRN: [id number]  : 1980  KAVIN: 2020    Reason:  Mild hemophilia B. Right mid thigh pain.     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 Armand's last hemophilia comprehensive clinic visit was back on 2018 and that was his last clinic visit at this center as well. Armand knows how to do self infusions but is carline at his technique. He is on on-demand / episodic factor IX replacement therapy. His last factor IX infusion was back in 2018 after he had a hamstring injury from playing softball.     He apparently was playing basketball back on 2020, 3 days ago, and someone accidentally hit him with their knee at this right mid thigh area. He immediate felt pain and has consistently having pain in this area for the past 3 days. He does have a dose of factor IX (Benefix) at home that he obtained back in 2018 for emergency use but likely is  now. He did not do self infusion of this dose of Benefix after this injury on 2020.     Armand reports that he has been icing and using a compression brace over this area since the injury. This does help with relieving pain. 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 He is concern enough with this new onset of numbness and tingling and thus called our center to request a same day appointment.     He is going to [place] on Friday (2020) morning for a ski trip and is returning on Monday 3/2/2020.     Armand recently had an  "evaluation by Dr. Thiago Santana of urology inquiring about vasectomy. He has not scheduled for this procedure as of yet and the patient would like to discuss this with us today.     ROS:  Denies any other bleeding issues.     Medication, Allergies and PmHx:  All have been reviewed by this writer in the electronic medical records.    Social History:  He is a dentist.     Family History:  He has two daughters. He also has 2 sons. The two daughters are obligated carriers. The 5 year old daughter apparently has been noted to have some oral lesion over the palate for which Armand is inquiring if these are related to hemophilia.     Objective:  Pleasant in no acute distress.  Vitals: /80 (BP Location: Left arm, Patient Position: Sitting, Cuff Size: Adult Regular)   Pulse 78   Resp 14   Ht 1.854 m (6' 0.99\")   Wt 84.9 kg (187 lb 3.2 oz)   SpO2 100%   BMI 24.70 kg/m    Exam:  There is some very minor swelling over the right mid thigh area on today's exam today. There is some pain on palpation over this area. He is right sided dominant and hence it is difficult to determine if this minor swelling is his baseline vs this is a intra-muscular hemorrhage. There is no obvious bruising over this area. Sensation and motion distal to this right mid thigh is all intact and normal. There is no swelling or effusion noted over the right knee.     Assessment:  In summary, Armand is a 39 year old male with mild hemophilia B with his baseline factor IX level around 11-17%, presents to clinic today with right mid thigh pain since his injury playing basketball on 2/23/2020. The mechanism of his injury (kneed by another player accidentally) is consistent with contusion of the right thigh. On today's exam, there is no obvious ecchymosis but we are not able to exclude intramuscular hemorrhage of the right bicep femoris or right thigh muscles. However, even if there was an intramuscular hemorrhage at the time of injury 3 days ago, " currently it is not likely actively bleeding any longer.      Diagnosis:  1. Mild hemophilia B.   2. Right thigh contusion, cannot exclude minor right thigh intramuscular hemorrhage.     Plan:  Although it is not likely actively bleeding any longer over his right mid thigh, considering that he has new onset of numbness and tingling and considering that he is heading out of town in 2 days to [place] (increase ambulation), it is reasonable to give him one dose of factor IX replacement today to prevent any possibility of re-bleeding within this area. I briefly discuss with him about some new extended half-life factor IX products on the market. I very briefly discuss with him the risk and benefits about these products. However, the patient elected to stay with Benefix at this time and is not interested in trying out the extended half-life products at this time. This is reasonable and thus we will give him one dose of Benefix today at around 40 Units/kg (about 3400 Units) here in clinic. He does not need any follow up dosing for this right mid thigh contusion.     He is instructed to continue the use of conservative management until symptom resolution. These include rest, ice, compression and elevation. I advised against skiing this weekend while in [place].     In regard to him thinking about getting a vasectomy, I explain to him that we have no opposition for him to undergo the procedure. However, he will need factor IX replacement before and maybe for 2-3 days after the procedure. Will not use antifibrinolytics as this is relatively contraindicated in genitourinary procedure. I have instructed him to call our center once he has this procedure scheduled and we can provide him with more details on hemophilia management.     He is inquiring about testing of his two daughters for factor IX levels. I explain that the easiest way to achieve this is through their pediatrician. Their pediatrician should be able to obtain factor IX  levels on his daughters as an outpatient visit.     Finally, he is overdue for his routine hemophilia comprehensive clinic visit. He is instructed to schedule this on his way out the clinic today.     He knows to contact our clinic if he should have any unusual bleeding issues or if his right thigh pain is worsen or persist.     Fior Rubio, nurse clinician is present throughout the entire clinic visit today.      Wayne Hayes PA-C, MPAS  Physician Assistant  [facility].

## 2020-03-06 ENCOUNTER — TELEPHONE (OUTPATIENT)
Dept: HEMATOLOGY | Facility: CLINIC | Age: 40
End: 2020-03-06

## 2020-03-06 ENCOUNTER — OFFICE VISIT (OUTPATIENT)
Dept: HEMATOLOGY | Facility: CLINIC | Age: 40
End: 2020-03-06
Attending: PHYSICIAN ASSISTANT
Payer: COMMERCIAL

## 2020-03-06 VITALS
WEIGHT: 187.83 LBS | SYSTOLIC BLOOD PRESSURE: 120 MMHG | BODY MASS INDEX: 24.79 KG/M2 | HEART RATE: 61 BPM | DIASTOLIC BLOOD PRESSURE: 77 MMHG | TEMPERATURE: 97.5 F

## 2020-03-06 DIAGNOSIS — M62.89 HEMORRHAGE OF MUSCLE: ICD-10-CM

## 2020-03-06 DIAGNOSIS — D67 MILD HEMOPHILIA B (H): Primary | ICD-10-CM

## 2020-03-06 LAB — FACT IX ACT/NOR PPP: 30 % (ref 65–150)

## 2020-03-06 PROCEDURE — 00000167 ZZHCL STATISTIC INR NC: Performed by: PHYSICIAN ASSISTANT

## 2020-03-06 PROCEDURE — G0463 HOSPITAL OUTPT CLINIC VISIT: HCPCS

## 2020-03-06 PROCEDURE — 00000401 ZZHCL STATISTIC THROMBIN TIME NC: Performed by: PHYSICIAN ASSISTANT

## 2020-03-06 PROCEDURE — 00000328 ZZHCL STATISTIC PTT NC: Performed by: PHYSICIAN ASSISTANT

## 2020-03-06 PROCEDURE — 96374 THER/PROPH/DIAG INJ IV PUSH: CPT

## 2020-03-06 PROCEDURE — 85250 CLOT FACTOR IX PTC/CHRSTMAS: CPT | Performed by: PHYSICIAN ASSISTANT

## 2020-03-06 PROCEDURE — 99214 OFFICE O/P EST MOD 30 MIN: CPT | Performed by: PHYSICIAN ASSISTANT

## 2020-03-06 RX ORDER — COAGULATION FACTOR IX (RECOMBINANT) 1000 UNIT
4000 KIT INTRAVENOUS ONCE
Status: COMPLETED | OUTPATIENT
Start: 2020-03-06 | End: 2020-03-06

## 2020-03-06 RX ADMIN — COAGULATION FACTOR IX (RECOMBINANT) 4000 UNITS: KIT at 14:43

## 2020-03-06 ASSESSMENT — PAIN SCALES - GENERAL: PAINLEVEL: MILD PAIN (2)

## 2020-03-06 NOTE — NURSING NOTE
Mr. Martin is a 39 year old man with mild Hemophilia B who comes to clinic today with worsening symptoms  from a right distal thigh basketball injury. After injury evaluation by Wayne Hayes PA-C it was decided that he would receive factor support and 4000 units of Benefix was infused into a left antecubital vein through a 25 gauge needle. AVS reviewed

## 2020-03-06 NOTE — PATIENT INSTRUCTIONS
1. Update the Center on Monday 3/9 about status of right leg.   2. Talk to Adriana Benson PT Monday about reintroducing activity to the right leg.  3. Schedule your comprehensive clinic appointment   4. Continue RICE interventions through the weekend and call on Monday with update.

## 2020-03-06 NOTE — TELEPHONE ENCOUNTER
Armand Martin  [id number]  1980  Hemophilia B 17%    Faraz Martin called today stating that he was having worsening symptoms of his thigh bleed.  It is more achy & tight today.  He is having intermiitant tingling in his calf and foot.  He was seen for this injury which occurred 2/23 (basketball with knee into his thigh) and received treatment with Benefix on 2/26.  He said his went skiing on Satruday and Sunday because it was feeling better.  He treated on 3/4 with some outdated Benefix that he had at home.  He has no bruising and only mildly swollen, but achiness and tightness worsening today, so calling for recommendations.  Discussed with MIGDALIA Whitley.  We would like to see him and most likely transfuse him with Benefix.     Mila Ellis, MSN, RN, PHN -Nurse Clinician, [facility] [phone]

## 2020-03-06 NOTE — PROGRESS NOTES
[facility]  [address] [place] [address] [address], [place] [address]  Main: [phone], Fax: [phone]    Patient seen at: [facility] at [address] [place] [address]    Outpatient Visit Note:    Patient: Armand Martin  MRN: [id number]  : 1980  KAVIN: 2020    Reason:  Mild hemophilia B. Right mid thigh pain.     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 Armand was last seen by this writer back on 2020 for same issue and was diagnosed by this writer with right thigh soft tissue hemorrhage.     Armand suffered an injury to the right mid thigh back on 2020 while playing basketball and someone accidentally hit him at this area. He was evaluated by this writer on 2020 and was diagnosed with right thigh contusion. At the time, this writer elected to treat him with a dose of Benefix at around 40 Units/kg dosing.     His last hemophilia comprehensive clinic visit was back on 2018.    Interim History:  2 days following his clinic visit on 2020, he flew out to [place] on a skiing trip. He reports that he did not ski the first day after he arrived. Then the next day, he felt great and his right thigh pain has resolved and thus he skied that day and the following day. He returned to [place] on Monday 3/2/2020. Then on 3/3/2020, he started to experience similar pain and swelling over the right mid thigh, he did self infuse a dose of Benefix that he has at home (that was ). This morning he called and is still complaining of pain and swelling over the right mid thigh along with some numbness down to his right foot. Thus he presents to clinic today for evaluation.     ROS:  No other bleeding issues to report.     Medication, Allergies and PmHx:  All have been  reviewed by this writer in the electronic medical records.    Social History and Family History:  Deferred.    Objective:  Pleasant in no acute distress.  Vitals: /77   Pulse 61   Temp 97.5  F (36.4  C)   Wt 85.2 kg (187 lb 13.3 oz)   BMI 24.79 kg/m    Exam:  There is an area of induration and swelling at the right mid thigh as compared to his left. There is no obvious ecchymosis at this area. There is distal lower extremity swelling. There is no swelling or effusions noted of the right knee.     Assessment:  In summary, Armand is a 39 year old male with mild hemophilia B with his baseline factor IX level of about 11-17%, returns to clinic today with recurrent right mid thigh pain and swelling. Despite this writer advise, Armand went skiing instead of resting that weekend. Armand likely has a recurrent hemorrhage at this area.     Diagnosis:  1. Mild Hemophilia B.  2. Right mid thigh pain. Right mid thigh contusion. Recurrent hemorrhage of right mid thigh soft tissue.     Plan:  Considering that Armand is experiencing recurrent swelling and pain over this area which was confirmed on today's physical exam, he likely has a recurrent bleeding episode over the right mid thigh soft tissue area. Although he did give himself a dose of Benefix. This dose of Benefix that he self infused was  product and thus its effectiveness is unclear. Thus I do think that he would benefit from an in-clinic factor IX replacement infusion today.     I spent some time today to discuss the use of an extended half-life factor IX product and discuss the risk and benefits of such product. I have answered all his questions to his satisfaction. After our discussion, he agrees to try such product. I have written a dose of Idelvion to be given at 50 Units/kg (about 4200 Units). Unfortunately, this product requires prior authorization through his insurance company. Thus the decision was made to use Benefix instead.     Benefix of  4250 Units +/- 10% was given in clinic today. He should not need any further repeat dosing of factor IX infusions. He is instructed to rest until right thigh symptoms has completely resolved. He is instructed to continue with Rest, Ice, Elevate and Compress (RICE) conservative management.     He is also encouraged to schedule for hemophilia comprehensive clinic visit with the next available clinic slot.     Tegan Matthews, nurse clinician is present throughout the entire clinic visit.      Wayne Hayes PA-C, MPAS  Physician Assistant  [facility].

## 2020-03-10 ENCOUNTER — TELEPHONE (OUTPATIENT)
Dept: HEMATOLOGY | Facility: CLINIC | Age: 40
End: 2020-03-10

## 2020-03-10 DIAGNOSIS — D67 MILD HEMOPHILIA B (H): Primary | ICD-10-CM

## 2020-03-10 NOTE — TELEPHONE ENCOUNTER
Armand Martin has mild hemophilia B with a baseline factor IX level of around 11-17%. He developed a right thigh soft tissue bleed from an injury while playing basketball late last month.  He was seen in clinic on 2020 and was treated with a dose of Benefix at around 40 Units/kg dosing.  This was infused in clinic into right antecubital.      Two days following his clinic visit, he flew out to [place] on a skiing trip. He reports that he did not ski the first day after he arrived. Then the next day, he felt great and his right thigh pain has resolved and thus he skied that day and the following day. He returned to [place] on Monday 3/2/2020. Then on 3/3/2020, he started to experience similar pain and swelling over the right mid thigh, he did self infuse a dose of Benefix that he has at home (that was ). He reports that he infused himself into the left antecubital.     On 3/6, he called still complaining of pain and swelling over the right mid thigh along with some numbness down to his right foot. He was seen in clinic an ultimately got another dose of Benefix, again into the left antecubital.     He calls today to provide an update.  He reports that his thigh feels like it is stabilizing after few days of decreased activity.  He did note that on 3/7, he noted pain near his injection site traveling up his arm into his armpit.  He is unable to palpate a chord of tightness and denies any visible redness along the arm.  He said it is less painful today than yesterday.   I told him I suspected phlebitis from his infusions.  I asked him to  keep track of his symptoms and to call us immediately with any worsening, visible redness, swelling on palpation, or fever.  He agreed.    Fior Rubio, RN - Nurse Clinician - [facility] - [phone]

## 2020-03-12 ENCOUNTER — TELEPHONE (OUTPATIENT)
Dept: PHYSICAL THERAPY | Facility: CLINIC | Age: 40
End: 2020-03-12

## 2020-03-17 NOTE — TELEPHONE ENCOUNTER
S: Patient contacted me via phone per the suggesion of his nurse to review return to mobility and activity following right thigh bleed.  B: Faraz has mild hemophilia B with a baseline factor IX level of around 11-17%. He developed a right thigh soft tissue bleed from an injury while playing basketball at the end of February 2020, specific date not known.  He was seen in clinic by his provider on 2/26/2020 and was treated with a dose of Benefix for right thigh muscle bleed.  Two days following his clinic visit, he flew out to [place] on a skiing trip. He reports that he did not ski the first day after he arrived. Then the next day, he felt great and his right thigh pain was much improved so he skied that day and the following day. He returned to [place] on Monday 3/2/2020. Then on 3/3/2020, he started to experience similar pain and swelling over the right mid thigh, he did self infuse a dose of Benefix that he has at home.   On 3/6, he contacted his clinic nurse still complaining of pain and swelling over the right mid thigh along with some numbness down to his right foot. He was seen in clinic and received another dose of Benefix.  Today Faraz is reporting slow improvement with resolution of previous numbness.    A:   Pain: Improved, none at rest, mild with weight bearing and walking, burning sensation reported with muscle contraction and then release.  He also has pain with activities that push into his limited ROM such as attempting to don socks.  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  He has adjusted his sitting position to minimize discomfort.  His work also requires standing and walking.  Faraz also reports today that he has another ski trip planned  for end of this month, March 2020.  He is questioning feasibility of that amount of activity.  Other: Faraz has questions today about when/how to return to activity and improved flexion ROM.  R: Educated patient on his current subacute phase and signs to monitor that he has moved into chronic and then to resolution.  Provided specific instructions on completion of ADLs without pain, isometrics, AAROM and AROM, and exercises with uninvolved left LE.  Educated on progression to pain free stretching, positional stretching with avoidance of overstretch. Offered in clinic assessment and treatment as needed.  Recommended full return of pain-free ROM and 90% to full strength before safe return to sport, specifically skiing for Faraz.    Answered all questions and encouraged continued contact with his provider, nurse and myself as needed for ongoing concerns.  Apoorva Benson [facility]  Physical Therapist  [facility]  [phone]

## 2020-03-19 DIAGNOSIS — D67 MILD HEMOPHILIA B (H): Primary | ICD-10-CM

## 2020-03-19 RX ORDER — COAGULATION FACTOR IX (RECOMBINANT) 3000 UNIT
4000 KIT INTRAVENOUS SEE ADMIN INSTRUCTIONS
Qty: 4000 EACH | Refills: 0 | Status: SHIPPED | OUTPATIENT
Start: 2020-03-19 | End: 2022-05-03

## 2020-06-08 ENCOUNTER — TELEPHONE (OUTPATIENT)
Dept: HEMATOLOGY | Facility: CLINIC | Age: 40
End: 2020-06-08

## 2020-06-08 DIAGNOSIS — R26.9 ABNORMAL GAIT: ICD-10-CM

## 2020-06-08 DIAGNOSIS — D67 MILD HEMOPHILIA B (H): Primary | ICD-10-CM

## 2020-06-08 NOTE — TELEPHONE ENCOUNTER
Called to change comp to video visit or reschedule comp. Unable to leave Vm. Texted patient 6/5 with options as well.

## 2021-01-24 ENCOUNTER — HEALTH MAINTENANCE LETTER (OUTPATIENT)
Age: 41
End: 2021-01-24

## 2021-01-28 ENCOUNTER — IMMUNIZATION (OUTPATIENT)
Dept: NURSING | Facility: CLINIC | Age: 41
End: 2021-01-28
Payer: COMMERCIAL

## 2021-01-28 PROCEDURE — 0001A PR COVID VAC PFIZER DIL RECON 30 MCG/0.3 ML IM: CPT

## 2021-01-28 PROCEDURE — 91300 PR COVID VAC PFIZER DIL RECON 30 MCG/0.3 ML IM: CPT

## 2021-02-18 ENCOUNTER — IMMUNIZATION (OUTPATIENT)
Dept: NURSING | Facility: CLINIC | Age: 41
End: 2021-02-18
Attending: INTERNAL MEDICINE
Payer: COMMERCIAL

## 2021-02-18 PROCEDURE — 0002A PR COVID VAC PFIZER DIL RECON 30 MCG/0.3 ML IM: CPT

## 2021-02-18 PROCEDURE — 91300 PR COVID VAC PFIZER DIL RECON 30 MCG/0.3 ML IM: CPT

## 2021-09-05 ENCOUNTER — HEALTH MAINTENANCE LETTER (OUTPATIENT)
Age: 41
End: 2021-09-05

## 2022-02-20 ENCOUNTER — HEALTH MAINTENANCE LETTER (OUTPATIENT)
Age: 42
End: 2022-02-20

## 2022-04-15 ENCOUNTER — TELEPHONE (OUTPATIENT)
Dept: HEMATOLOGY | Facility: CLINIC | Age: 42
End: 2022-04-15
Payer: COMMERCIAL

## 2022-04-20 DIAGNOSIS — R26.9 ABNORMAL GAIT: Primary | ICD-10-CM

## 2022-04-20 DIAGNOSIS — D67 MILD HEMOPHILIA B (H): ICD-10-CM

## 2022-04-30 NOTE — PROGRESS NOTES
[facility]  [facility]  [facility], [address], [place] [address]  Main: [phone], Fax: [phone]    Outpatient Visit Note:    Patient: Armand Martin  MRN: [id number]  : 1980  KAVIN: May 3, 2022    Reason for Visit:   Armand Martin is a 41 year old man with mild hemophilia B (baseline FIX 11-17%) that is well known to our center. He is here today primarily to discuss periprocedural recommendations for an upcoming colonoscopy.    Hemophilia History:    Mild hemophilia B (baseline FIX 11-17%).    Treats on demand with rFIX.     Armand generally has a mild bleeding phenotype, but with injury or trauma (he is very active in sports), he has experienced some soft tissue hemorrhages (which we have historically treated with Benefix).    He has no history of hemarthrosis or hemophilic arthropathy.    Other Pertinent Past Medical/Surgical History:    History of Crohn's Disease affecting the large intestine s/p partial colectomy in [place] in . He was treated perioperatively with Benefix.    Due to his history of Crohn's, he does routinely have colonoscopies with biopsy every few years. Historically, he has been treated with 1 pre-procedural dose of Benefix and post-procedural antifibrinolytics.    May 2012--Underwent melanoma removal surgery without pre-op factor. This was complicated by incisional hematoma and wound dehiscence, for which he was managed with factor.    Throughout the years, he has had over 35 nevi removed. He has not gotten factor IX replacement prior to most of these procedures and has done well.    Interval History:   Last clinic visit was as comprehensive clinic visit on 2018. In the interval since the last clinic visit:      2019--Underwent colonoscopy with biopsy, was given 4000u Benefix prior to the procedure, and a 10 day course of Amicar post-procedure. Did not have any bleeding complications.       "2019--Sports injury (got kneed in the right distal thigh while playing basketball). Was treated in the [facility] with 1 dose of Benefix on 2019.      2020--Called center with concern for re-bleed of right thigh after going skiing. Reported that he had treated himself with an  dose of factor on 2020, but did not notice significant improvement. It was then recommended that patient come to the [facility] for a dose of factor, which he did. His symptoms resolved over the next week.    He has otherwise had no bleeding events. He keeps a dose of factor on hand at home in case of emergency, and feels that he could self-infuse in a pinch if needed--though given how infrequently he does this, he does not feel proficient and prefers to come to clinic for infusion.    He had a mild-moderate case of COVID-19 in 2022. He was able to manage his symptoms conservatively at home. However, interestingly, since that time, he feels that his Crohn's symptoms have worsened. He has been working with his GI team to optimize his medication regimen. Further surgeries have been discussed, but no decisions have been made in regards to this.    He has otherwise been generally well.    ROS:  Denies epistaxis, oral/mucosal bleeding, excessive bruising/ecchymosis, hematuria, hematochezia, and melena.    Social History:  Works as a dentist.    Family History:  Armand is part of a large family with multiple members with mild hemophilia B. He has six maternal uncles with mild hemophilia B who are all known to our center.     Please see genetic counselor Francesca Nuno's note dated 2018 for detailed family history.    Objectives:  /74   Pulse 85   Temp 97.9  F (36.6  C) (Oral)   Resp 16   Ht 1.854 m (6' 1\")   Wt 86.7 kg (191 lb 3.2 oz)   SpO2 99%   BMI 25.23 kg/m    Exam:   Constitutional: Appears well, no distress  Respiratory: " Normal work of breathing.  Skin: no petechiae, no ecchymosis.  Neuro: AOx3    Assessment:  In summary, Armand Martin is a 41 year old man with a past medical history significant for melanoma s/p excision in 2012, Crohn's disease, and mild hemophilia B (baseline factor IX level 11-17%) that is here today for follow-up/to discuss periprocedural recommendations for colonoscopy + biopsy later today.    Armand has been doing quite well from a hemophilia standpoint. He continues to treat on demand with rFIX (Benefix). He has had only one injury-provoked soft tissue hemorrhage since his last comprehensive clinic visit. His joints are well preserved with no obvious evidence of any hemophilic arthropathy.    He has had some worsening Crohn's this year and has been working with his GI team to optimize his medication regimen. Further surgeries have been discussed, but no decisions have been made in regards to this. He has a colonoscopy with biopsy scheduled for later today.    Plan:  No change in regard to management of his mild hemophilia B. He will continue with on-demand factor IX replacement. Will refill home dose of Benefix for emergency use.     For colonoscopy + biopsy later today:  1) Benefix 70 units/kg (Rx 5,950 units) to be infused in clinic today prior to colonoscopy later today  2) Amicar 3g PO q6-8h x10 days post-colonoscopy    Faraz will contact us prior to any scheduled surgeries/procedures to discuss a hemophilia management plan.    The patient is given our center's contact information and is instructed to call if he should have any further questions or concerns.  Otherwise, we will plan on seeing him back in 1 year.      Fior Rubio, nurse clinician is present throughout the entire clinic visit with the patient today.  Patient understands and agrees with the above plan and recommendation.    35 minutes spent on the date of the encounter doing chart review, history and exam, documentation and further  activities per the note           Manuela Monte PA-C, LIZETH STEPHENSON [facility]  [facility]  [address], [address], [place] [address]  Main: [phone], Fax: [phone]

## 2022-05-03 ENCOUNTER — OFFICE VISIT (OUTPATIENT)
Dept: HEMATOLOGY | Facility: CLINIC | Age: 42
End: 2022-05-03
Attending: PHYSICIAN ASSISTANT
Payer: COMMERCIAL

## 2022-05-03 ENCOUNTER — ALLIED HEALTH/NURSE VISIT (OUTPATIENT)
Dept: HEMATOLOGY | Facility: CLINIC | Age: 42
End: 2022-05-03
Payer: COMMERCIAL

## 2022-05-03 VITALS
TEMPERATURE: 97.9 F | SYSTOLIC BLOOD PRESSURE: 106 MMHG | BODY MASS INDEX: 25.34 KG/M2 | RESPIRATION RATE: 16 BRPM | OXYGEN SATURATION: 99 % | WEIGHT: 191.2 LBS | DIASTOLIC BLOOD PRESSURE: 74 MMHG | HEIGHT: 73 IN | HEART RATE: 85 BPM

## 2022-05-03 DIAGNOSIS — D67 MILD HEMOPHILIA B (H): Primary | ICD-10-CM

## 2022-05-03 PROCEDURE — 999N000103 HC STATISTIC NO CHARGE FACILITY FEE

## 2022-05-03 PROCEDURE — 96374 THER/PROPH/DIAG INJ IV PUSH: CPT

## 2022-05-03 PROCEDURE — G0463 HOSPITAL OUTPT CLINIC VISIT: HCPCS

## 2022-05-03 PROCEDURE — G0463 HOSPITAL OUTPT CLINIC VISIT: HCPCS | Mod: 25

## 2022-05-03 PROCEDURE — 99214 OFFICE O/P EST MOD 30 MIN: CPT | Performed by: PHYSICIAN ASSISTANT

## 2022-05-03 RX ORDER — AMINOCAPROIC ACID 1000 MG/1
3 TABLET ORAL EVERY 6 HOURS
Qty: 120 TABLET | Refills: 0 | Status: SHIPPED | OUTPATIENT
Start: 2022-05-03 | End: 2024-07-11

## 2022-05-03 RX ORDER — COAGULATION FACTOR IX (RECOMBINANT) 3000 UNIT
4000 KIT INTRAVENOUS SEE ADMIN INSTRUCTIONS
Qty: 4000 EACH | Refills: 0 | Status: SHIPPED | OUTPATIENT
Start: 2022-05-03

## 2022-05-03 ASSESSMENT — PAIN SCALES - GENERAL: PAINLEVEL: NO PAIN (0)

## 2022-10-23 ENCOUNTER — HEALTH MAINTENANCE LETTER (OUTPATIENT)
Age: 42
End: 2022-10-23

## 2023-04-02 ENCOUNTER — HEALTH MAINTENANCE LETTER (OUTPATIENT)
Age: 43
End: 2023-04-02

## 2023-06-12 ENCOUNTER — TRANSFERRED RECORDS (OUTPATIENT)
Dept: HEALTH INFORMATION MANAGEMENT | Facility: CLINIC | Age: 43
End: 2023-06-12
Payer: COMMERCIAL

## 2024-01-31 NOTE — PROGRESS NOTES
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 He presents today for routine follow-up.     Hemophilia History:   Mild hemophilia B (baseline FIX 11-17%).   Treats on demand with rFIX (currently Benefix). He is able to do self-venipuncture if needed.   Armand generally has a mild bleeding phenotype, but with injury or trauma, he has experienced some soft tissue/intramuscular  hemorrhages (which we have historically treated with Benefix).   He has no history of hemarthrosis or hemophilic arthropathy.   As of , he was negative for HIV and Hepatitis C.     Estimated total lifetime exposure days to FIX concentrates: 20.    Other Pertinent Past Medical/Surgical History:   History of Crohn's Disease affecting the large intestine s/p partial colectomy in [place] in . He was treated perioperatively with Benefix.   Due to his history of Crohn's Disease, he gets regular colonoscopies with biopsies. These have been managed in the past with 1 pre-procedural dose of Benefix and post-procedural antifibrinolytics.   May 2012--Underwent melanoma removal surgery without pre-operative FIX replacement. This was c/b by incisional hematoma and wound dehiscence. This was managed with Benefix.    Throughout the years, he has had over 35 nevi removed. He has not gotten factor IX replacement prior to most of these procedures and has not had bleeding complications.    Interval History:  We last saw Faraz in May 2022 prior to colonoscopy. In the interim, he has had no bleeding events, no factor use, and no significant health changes.  He does feel that he could still self-infuse if he had to.     He denies any current bleeding concerns such as epistaxis, gingival bleeding, hematuria, hematochezia, melena, skin/soft tissue bleeding, intramuscular bleeding, and joint bleeding.    Social History:  Patient works as a dentist.    Family History:  Armand is part of a large family with multiple members with mild hemophilia B. He has six maternal uncles with mild hemophilia B who are all known to our center. None of these family members have a history of FIX inhibitor. Please see genetic counselor Francesca Nuno's note dated 07/09/2018 for detailed family history.    Objective:  /73 (BP Location: Right arm, Patient Position: Standing, Cuff Size: Adult Regular)   Pulse 61   Temp (!) 96.3  F (35.7  C) (Tympanic)   Wt 87.1 kg (192 lb)   SpO2 100%   BMI 25.33 kg/m    Wt Readings from Last 5 Encounters:   02/01/24 87.1 kg (192 lb)   05/03/22 86.7 kg (191 lb 3.2 oz)   03/06/20 85.2 kg (187 lb 13.3 oz)   02/26/20 84.9 kg (187 lb 3.2 oz)   02/04/20 83.9 kg (185 lb)     Exam:   Constitutional: Well appearing. Not in distress.  Respiratory: Breathing comfortably on room air.  Skin: No significant ecchymosis.  Neuro: Aox3.    Assessment:  In summary, Armand Martin (Joe) is a 43 year old male that has mild hemophilia B without inhibitor. His baseline factor IX level is 11-17%. He has a mild bleeding phenotype overall but he has experienced skin/soft tissue bleeding and intramuscular hemorrhage 2/2 injuries/surgeries in the past (summarized above). He has not historically had issues with hemarthrosis and thus has no hemarthropathy. He has an estimated 20 total exposure days to FIX concentrates. We reviewed that the incidence of FIX inhibitor development in patients with hemophilia B is low overall (~3%) and given that Faraz has reached ~20 lifetime exposure days without issue and the fact that he has a strongly family history of hemophilia B and there has been no  report of inhibitor development in any of his relatives, I think he is quite low risk for inhibitor development. I continue to think that pre-op FIX infusions prior to his routine colonoscopies with biopsies (due to Crohn's) is the appropriate plan.      Plan:  1) Benefix 60u/kg as needed for bleeding.  2) Contact the [facility] in the event of bleeding concerns and prior to scheduled surgeries/procedures to discuss perioperative planning.  3) Follow-up in 1-2 years. Sooner as needed.    Updated outpatient Benefix Rx.   Updated and provided patient copy of emergency treatment recommendation.    Total time spent on day of encounter: 30 min.        Manuela Monte PA-C, [facility]  [facility]  [address], [address], [place] [address]  Main: [phone], Fax: [phone]

## 2024-02-01 ENCOUNTER — OFFICE VISIT (OUTPATIENT)
Dept: HEMATOLOGY | Facility: CLINIC | Age: 44
End: 2024-02-01
Attending: PHYSICIAN ASSISTANT
Payer: COMMERCIAL

## 2024-02-01 VITALS
SYSTOLIC BLOOD PRESSURE: 121 MMHG | WEIGHT: 192 LBS | DIASTOLIC BLOOD PRESSURE: 73 MMHG | HEART RATE: 61 BPM | OXYGEN SATURATION: 100 % | TEMPERATURE: 96.3 F | BODY MASS INDEX: 25.33 KG/M2

## 2024-02-01 DIAGNOSIS — D67 MILD HEMOPHILIA B (H): Primary | ICD-10-CM

## 2024-02-01 PROCEDURE — 99213 OFFICE O/P EST LOW 20 MIN: CPT | Performed by: PHYSICIAN ASSISTANT

## 2024-02-01 PROCEDURE — 99214 OFFICE O/P EST MOD 30 MIN: CPT | Performed by: PHYSICIAN ASSISTANT

## 2024-02-01 RX ORDER — COAGULATION FACTOR IX (RECOMBINANT) 2000 UNIT
4748 KIT INTRAVENOUS DAILY PRN
Qty: 4220 EACH | Refills: 0 | Status: SHIPPED | OUTPATIENT
Start: 2024-02-01

## 2024-02-01 RX ORDER — COAGULATION FACTOR IX (RECOMBINANT) 500 UNIT
4748 KIT INTRAVENOUS DAILY PRN
Qty: 528 EACH | Refills: 0 | Status: SHIPPED | OUTPATIENT
Start: 2024-02-01

## 2024-02-01 RX ORDER — COAGULATION FACTOR IX (RECOMBINANT) 3000 UNIT
5000 KIT INTRAVENOUS DAILY PRN
Qty: 5000 EACH | Refills: 1 | Status: SHIPPED | OUTPATIENT
Start: 2024-02-01 | End: 2024-02-01

## 2024-02-01 NOTE — PATIENT INSTRUCTIONS
[facility]  [facility]  [address], [address], [place] [address]  Main: [phone], Fax: [phone]

## 2024-02-21 ENCOUNTER — DOCUMENTATION ONLY (OUTPATIENT)
Dept: HEMATOLOGY | Facility: CLINIC | Age: 44
End: 2024-02-21
Payer: COMMERCIAL

## 2024-02-21 NOTE — PROGRESS NOTES
Medication/Dose: Benefix 2000 unit   Cover My Meds Key: [id number]     If notification received from pharmacy:   Pharmacy name: [facility]   Pharmacy Phone: [phone]   Insurance name: [facility]     PA approved.   Effective dates: 02/09/2024 to 05/09/2024   Case #: [id number]         verbal cues/nonverbal cues (demo/gestures)/1 person assist

## 2024-06-02 ENCOUNTER — HEALTH MAINTENANCE LETTER (OUTPATIENT)
Age: 44
End: 2024-06-02

## 2024-07-11 ENCOUNTER — TELEPHONE (OUTPATIENT)
Dept: HEMATOLOGY | Facility: CLINIC | Age: 44
End: 2024-07-11
Payer: COMMERCIAL

## 2024-07-11 DIAGNOSIS — D67 MILD HEMOPHILIA B (H): ICD-10-CM

## 2024-07-11 RX ORDER — AMINOCAPROIC ACID 1000 MG/1
3 TABLET ORAL SEE ADMIN INSTRUCTIONS
Qty: 120 TABLET | Refills: 0 | Status: SHIPPED | OUTPATIENT
Start: 2024-07-11 | End: 2024-07-18

## 2024-07-11 RX ORDER — COAGULATION FACTOR IX (RECOMBINANT) 2000 UNIT
4160 KIT INTRAVENOUS ONCE
Status: COMPLETED | OUTPATIENT
Start: 2024-07-31 | End: 2024-07-31

## 2024-07-11 NOTE — TELEPHONE ENCOUNTER
[id number]  Armand Martin  43 year old male  CBCD Diagnosis: mild hemophilia B (11-17%)  CBCD Provider: Manuela Monte PA-C    Call received from Faraz looking to arrange a factor infusion prior to his upcoming colonoscopy on  at [facility] in [place] at 1245pm.    [facility]. He states that while he can self infuse, he knows that he'll be tired and dehydrated from the colonoscopy prep so he'd appreciate our support. Faraz anticipates that they will take biopsies as they usually do r/t to Crohn's disease. His current supply of amicar at home is  so he'd need a new Rx that he can  on  when [facility].     Plan made to schedule Faraz at the [facility] at 1145am on  for factor infusion. Will route to providers for CAM orders and Amicar/TXA order.     Faraz denies additional questions or concerns.    Mary OROSCON, RN, PHN   [facility]   Office: [phone]  Fax: [phone]

## 2024-07-11 NOTE — PROGRESS NOTES
[facility]  [facility]  [facility], [address], [place] [address]  Main: [phone], Fax: [phone]    Documentation Note:    Patient: Armand Martin  MRN: [id number]  : 1980  Date of this note written: 2024    Brief History:  Armand is a 43 year old male with a history of mild hemophilia B with his baseline factor IX level of 11-17% who also has Crohn's disease S/P partial colectomy in , who is currently scheduled to undergo colonoscopy on 2024 at [facility] in [facility] at around 12:45pm.     Hemophilia treatment plan for his upcoming colonoscopy is as follow:  Infuse Benefix at 4000 Units +/- 10% IV bolus at 10 mL/min just  minutes prior to his scheduled colonoscopy procedure. He has used this dose for his colonoscopy before and did well. Clinic administer order placed by this writer today. He will come into our clinic on 2024 morning to get this Benefix infusion prior to his colonoscopy.   If biopsy was done, then he is to take oral aminocaproic acid at 3 grams PO TID x 10 days post procedure. Rx of oral aminocaproic acid is sent to our clinic's pharmacy by this writer on 2024.       Wayne Hayes PA-C, MPAS  Physician Assistant  [facility].

## 2024-07-18 RX ORDER — TRANEXAMIC ACID 650 MG/1
1300 TABLET ORAL SEE ADMIN INSTRUCTIONS
Qty: 60 TABLET | Refills: 11 | Status: SHIPPED | OUTPATIENT
Start: 2024-07-18

## 2024-07-19 ENCOUNTER — DOCUMENTATION ONLY (OUTPATIENT)
Dept: HEMATOLOGY | Facility: CLINIC | Age: 44
End: 2024-07-19
Payer: COMMERCIAL

## 2024-07-19 NOTE — PROGRESS NOTES
Medication/Dose: Benefix 2000 unit   Cover My Meds Key: [id number]     If notification received from pharmacy:   Pharmacy name: [facility]   Pharmacy Phone: [phone]   Insurance name: [facility] [employer]      PA approved.   Effective dates: 06/18/2024 to 08/18/2024   Case #: [id number]         Majo Wright - [facility] Student

## 2024-07-31 ENCOUNTER — ALLIED HEALTH/NURSE VISIT (OUTPATIENT)
Dept: HEMATOLOGY | Facility: CLINIC | Age: 44
End: 2024-07-31
Payer: COMMERCIAL

## 2024-07-31 VITALS
BODY MASS INDEX: 24.15 KG/M2 | OXYGEN SATURATION: 98 % | WEIGHT: 182.2 LBS | TEMPERATURE: 97.9 F | SYSTOLIC BLOOD PRESSURE: 113 MMHG | HEART RATE: 87 BPM | HEIGHT: 73 IN | DIASTOLIC BLOOD PRESSURE: 85 MMHG

## 2024-07-31 DIAGNOSIS — D67 MILD HEMOPHILIA B (H): Primary | ICD-10-CM

## 2024-07-31 PROCEDURE — 99207 PR NO CHARGE INJECTABLE MED/DRUG: CPT

## 2024-07-31 PROCEDURE — 96374 THER/PROPH/DIAG INJ IV PUSH: CPT

## 2024-07-31 PROCEDURE — 999N000103 HC STATISTIC NO CHARGE FACILITY FEE

## 2024-07-31 RX ADMIN — COAGULATION FACTOR IX (RECOMBINANT) 4160 UNITS: KIT at 12:09

## 2024-07-31 NOTE — NURSING NOTE
[id number]  Armand Martin  43 year old male  CBCD Diagnosis: Mild Hemophilia B (baseline FIX 11-17%   CBCD Provider: APRIL Gonzalez presented to clinic today for a Benefix infusion prior to colonoscopy. Vital signs stable. Allergies reviewed.     Using patient supplied medication, RN assisted patient with infusing 4,160 units of Benefix via right Antecubital. Good blood return noted throughout infusion. No signs of infiltration or discomfort at site of infusion.     Patient discharged in good condition. He picked up his tranexamic acid at our pharmacy.    Margaret Muller RN, BSN, PCCN  Nurse Clinician    [facility] for [facility]  [address] [place] [address], [address], [place] [address]   Office, direct: [phone]  Main office number: [phone]  Pronouns: She, her, hers

## 2025-06-15 ENCOUNTER — HEALTH MAINTENANCE LETTER (OUTPATIENT)
Age: 45
End: 2025-06-15